# Patient Record
Sex: FEMALE | Race: WHITE | NOT HISPANIC OR LATINO | Employment: FULL TIME | URBAN - METROPOLITAN AREA
[De-identification: names, ages, dates, MRNs, and addresses within clinical notes are randomized per-mention and may not be internally consistent; named-entity substitution may affect disease eponyms.]

---

## 2012-03-22 LAB — EXTERNAL HIV SCREEN: NORMAL

## 2017-03-27 ENCOUNTER — ALLSCRIPTS OFFICE VISIT (OUTPATIENT)
Dept: OTHER | Facility: OTHER | Age: 37
End: 2017-03-27

## 2017-05-10 ENCOUNTER — ALLSCRIPTS OFFICE VISIT (OUTPATIENT)
Dept: OTHER | Facility: OTHER | Age: 37
End: 2017-05-10

## 2017-10-30 ENCOUNTER — ALLSCRIPTS OFFICE VISIT (OUTPATIENT)
Dept: OTHER | Facility: OTHER | Age: 37
End: 2017-10-30

## 2017-10-31 NOTE — PROGRESS NOTES
Assessment  1  Acute bronchitis (466 0) (J20 9)    Plan  Acute bronchitis    · Azithromycin 250 MG Oral Tablet; Take 2 tablets today, then one tablet daily for 4  days   · Mprgofvrx-Tjedshjx-FB 30-2-10 MG/5ML Oral Syrup; TAKE 5 ML EVERY 4 TO 6  HOURS AS NEEDED   · Follow Up if Not Better Evaluation and Treatment  Follow-up  Status: Complete  Done:  [id number] 05:55PM   · Drink plenty of fluids ; Status:Complete;   Done: 81DYW3927 05:55PM    Discussion/Summary    The patient will increase fluids and rest  Patient will use ibuprofen as needed for fever  Patient use lozenges  Chief Complaint  Patient presented today with stuffy nose, post nasal drip and mucus  Patient c/o of coughing hurts and feeling very weak  History of Present Illness  HPI: Patient is here with congestion, rhinorrhea, postnasal drip, fatigue over the past 2-3 days  No vomiting or diarrhea  Patient does have a cough  The patient with some productive sputum  Patient also with fever  Patient using ibuprofen  Patient also using cough drops  No nausea vomiting or diarrhea  Last menstrual cycle roughly 3 weeks ago  Review of Systems    Constitutional: as noted in HPI    ENT: as noted in HPI  Cardiovascular: no complaints of slow or fast heart rate, no chest pain, no palpitations, no leg claudication or lower extremity edema  Respiratory: as noted in HPI  Breasts: no complaints of breast pain, breast lump or nipple discharge  Gastrointestinal: no complaints of abdominal pain, no constipation, no nausea or diarrhea, no vomiting, no bloody stools  Genitourinary: no complaints of dysuria, no incontinence, no pelvic pain, no dysmenorrhea, no vaginal discharge or abnormal vaginal bleeding  Integumentary: as noted in HPI  Neurological: no complaints of headache, no confusion, no numbness or tingling, no dizziness or fainting  Active Problems  1  Depression with anxiety (300 4) (F41 8)   2  Lower back pain (724 2) (M54 5)   3  Metatarsalgia (726 70) (M77 40)   4  Need for prophylactic vaccination and inoculation against influenza (V04 81) (Z23)   5  Somatic dysfunction of lumbar region (739 3) (M99 03)   6  Tinea corporis (110 5) (B35 4)   7  URTI (acute upper respiratory infection) (465 9) (J06 9)    Past Medical History  1  History of Cardiomyopathy (425 4) (I42 9)   2  History of Depression (311) (F32 9)   3  History of bicuspid aortic valve (V13 65) (Z87 74)   4  History of Unequal leg length (acquired) (736 81) (M21 70)  Active Problems And Past Medical History Reviewed: The active problems and past medical history were reviewed and updated today  Family History  Mother    1  No pertinent family history    Social History   · Denied: History of Alcohol Use (History)   · Never A Smoker    Surgical History  1  History of Aortic Coarctation Repair   2  History of Knee Surgery    Current Meds   1  Betamethasone Valerate 0 1 % External Cream; APPLY SPARINGLY TO AFFECTED   AREA(S) TWICE DAILY; Therapy: 78Rrc0685 to (Last Rx:13Apr2017)  Requested for: 14Apr2017 Ordered   2  Ketoconazole 2 % External Cream; APPLY TO AFFECTED AREA TWICE A DAY; Therapy: 16DZZ9705 to (Evaluate:46Nsn4751)  Requested for: 63XMM0275; Last   Rx:06Wvs6029 Ordered   3  Sertraline HCl - 100 MG Oral Tablet; take 1 tablet by mouth daily; Therapy: 34TXI6374 to (Josue Solomon)  Requested for: 47IRO1068; Last   Rx:06Mar2017 Ordered    The medication list was reviewed and updated today  Allergies  1  Augmentin TABS   2  Bactrim TABS    Vitals   Recorded: 79XPK4850 05:36PM   Temperature 101 9 F, Tympanic   Systolic 894, LLE, Sitting   Diastolic 70, LLE, Sitting   Height 5 ft 6 in   Weight 192 lb 6 oz   BMI Calculated 31 05   BSA Calculated 1 97     Physical Exam    Constitutional   General appearance: No acute distress, well appearing and well nourished  Eyes   Conjunctiva and lids: No swelling, erythema or discharge      Pupils and irises: Equal, round and reactive to light  Ears, Nose, Mouth, and Throat   External inspection of ears and nose: Normal     Otoscopic examination: Tympanic membranes translucent with normal light reflex  Canals patent without erythema  Nasal mucosa, septum, and turbinates: Normal without edema or erythema  Oropharynx: Abnormal  -- Postnasal drip  Pulmonary   Respiratory effort: No increased work of breathing or signs of respiratory distress  Auscultation of lungs: Abnormal  -- Mild rhonchi bilaterally  Cardiovascular   Palpation of heart: Normal PMI, no thrills  Auscultation of heart: Normal rate and rhythm, normal S1 and S2, without murmurs  Examination of extremities for edema and/or varicosities: Normal     Abdomen   Abdomen: Non-tender, no masses  Liver and spleen: No hepatomegaly or splenomegaly  Lymphatic   Palpation of lymph nodes in neck: No lymphadenopathy  Musculoskeletal   Gait and station: Normal     Digits and nails: Normal without clubbing or cyanosis  Inspection/palpation of joints, bones, and muscles: Normal     Skin   Skin and subcutaneous tissue: Normal without rashes or lesions  Neurologic   Cranial nerves: Cranial nerves 2-12 intact  Reflexes: 2+ and symmetric  Sensation: No sensory loss      Psychiatric   Orientation to person, place, and time: Normal     Mood and affect: Normal          Signatures   Electronically signed by : Rhina Diaz DO; Oct 30 2017  5:56PM EST                       (Author)

## 2018-01-13 VITALS
TEMPERATURE: 98 F | DIASTOLIC BLOOD PRESSURE: 70 MMHG | BODY MASS INDEX: 30.23 KG/M2 | WEIGHT: 188.13 LBS | SYSTOLIC BLOOD PRESSURE: 122 MMHG | HEIGHT: 66 IN

## 2018-01-14 VITALS
BODY MASS INDEX: 30.25 KG/M2 | WEIGHT: 188.25 LBS | SYSTOLIC BLOOD PRESSURE: 140 MMHG | HEIGHT: 66 IN | DIASTOLIC BLOOD PRESSURE: 60 MMHG

## 2018-01-14 VITALS
TEMPERATURE: 101.9 F | WEIGHT: 192.38 LBS | HEIGHT: 66 IN | SYSTOLIC BLOOD PRESSURE: 130 MMHG | DIASTOLIC BLOOD PRESSURE: 70 MMHG | BODY MASS INDEX: 30.92 KG/M2

## 2018-01-16 NOTE — MISCELLANEOUS
Message  Return to work or school:   Marisela Bojorquez is under my professional care   She was seen in my office on 10/30/2017   She is able to return to work on  11/01/2017            Signatures   Electronically signed by : Aleksandr Rosas, ; Oct 30 2017  5:59PM EST                       (Author)

## 2018-11-23 ENCOUNTER — OFFICE VISIT (OUTPATIENT)
Dept: FAMILY MEDICINE CLINIC | Facility: CLINIC | Age: 38
End: 2018-11-23
Payer: COMMERCIAL

## 2018-11-23 VITALS
SYSTOLIC BLOOD PRESSURE: 126 MMHG | WEIGHT: 180.8 LBS | HEIGHT: 67 IN | BODY MASS INDEX: 28.38 KG/M2 | TEMPERATURE: 97.1 F | DIASTOLIC BLOOD PRESSURE: 64 MMHG

## 2018-11-23 DIAGNOSIS — H69.83 DYSFUNCTION OF BOTH EUSTACHIAN TUBES: Primary | ICD-10-CM

## 2018-11-23 PROBLEM — H69.93 DYSFUNCTION OF BOTH EUSTACHIAN TUBES: Status: ACTIVE | Noted: 2018-11-23

## 2018-11-23 PROCEDURE — 1036F TOBACCO NON-USER: CPT | Performed by: FAMILY MEDICINE

## 2018-11-23 PROCEDURE — 99213 OFFICE O/P EST LOW 20 MIN: CPT | Performed by: FAMILY MEDICINE

## 2018-11-23 PROCEDURE — 3008F BODY MASS INDEX DOCD: CPT | Performed by: FAMILY MEDICINE

## 2018-11-23 RX ORDER — FLUTICASONE PROPIONATE 50 MCG
1 SPRAY, SUSPENSION (ML) NASAL DAILY
Qty: 16 G | Refills: 0 | Status: SHIPPED | OUTPATIENT
Start: 2018-11-23 | End: 2019-05-30

## 2018-11-23 NOTE — PROGRESS NOTES
Assessment/Plan:  The patient will try Flonase  Guidance given  Diagnoses and all orders for this visit:    Dysfunction of both eustachian tubes  -     fluticasone (FLONASE) 50 mcg/act nasal spray; 1 spray into each nostril daily          Subjective:      Patient ID: Kriss Anne is a 43 y o  female  Patient is here with left ear pain over the past week and half  Patient seen [facility] last week and given amoxicillin without significant improvement  Ear feels blocked  Slight decrease in hearing  No otorrhea  Patient did have episode of dizziness last week  No vomiting or vertigo  Patient does notice some tinnitus  The the right ear feels blocked intermittently also  Patient did notice congestion last week  The patient still with some cough  No fever  No other medication use  Earache    Associated symptoms include headaches  Headache    Associated symptoms include ear pain  The following portions of the patient's history were reviewed and updated as appropriate: allergies, current medications, past family history, past medical history, past social history, past surgical history and problem list     Review of Systems   Constitutional: Negative  HENT: Positive for ear pain  Eyes: Negative  Respiratory: Negative  Cardiovascular: Negative  Gastrointestinal: Negative  Endocrine: Negative  Genitourinary: Negative  Musculoskeletal: Negative  Skin: Negative  Allergic/Immunologic: Negative  Neurological: Positive for headaches  Hematological: Negative  Psychiatric/Behavioral: Negative  Objective:      /64 (BP Location: Right arm, Patient Position: Sitting, Cuff Size: Adult)   Temp (!) 97 1 °F (36 2 °C) (Tympanic)   Ht 5' 6 75" (1 695 m)   Wt 82 kg (180 lb 12 8 oz)   LMP 10/26/2018 (Exact Date)   BMI 28 53 kg/m²          Physical Exam   Constitutional: She is oriented to person, place, and time   She appears well-developed and well-nourished  No distress  HENT:   Head: Normocephalic  Right Ear: External ear normal    Left Ear: External ear normal    Mouth/Throat: Oropharyngeal exudate present  Eyes: Pupils are equal, round, and reactive to light  EOM are normal  Right eye exhibits no discharge  Left eye exhibits no discharge  No scleral icterus  Neck: Normal range of motion  Neck supple  No thyromegaly present  Cardiovascular: Normal rate, regular rhythm and intact distal pulses  Exam reveals no gallop and no friction rub  Murmur heard  Pulmonary/Chest: Effort normal and breath sounds normal  No respiratory distress  She has no wheezes  She has no rales  She exhibits no tenderness  Abdominal: Soft  Bowel sounds are normal  She exhibits no distension  There is no tenderness  There is no rebound and no guarding  Musculoskeletal: Normal range of motion  She exhibits no edema or tenderness  Lymphadenopathy:     She has no cervical adenopathy  Neurological: She is oriented to person, place, and time  No cranial nerve deficit  She exhibits normal muscle tone  Coordination normal    Skin: Skin is warm and dry  No rash noted  She is not diaphoretic  No erythema  No pallor  Psychiatric: She has a normal mood and affect  Her behavior is normal  Judgment and thought content normal    Nursing note and vitals reviewed

## 2019-05-30 ENCOUNTER — OFFICE VISIT (OUTPATIENT)
Dept: FAMILY MEDICINE CLINIC | Facility: CLINIC | Age: 39
End: 2019-05-30
Payer: COMMERCIAL

## 2019-05-30 VITALS
DIASTOLIC BLOOD PRESSURE: 68 MMHG | HEIGHT: 66 IN | BODY MASS INDEX: 27.32 KG/M2 | TEMPERATURE: 98.4 F | SYSTOLIC BLOOD PRESSURE: 92 MMHG | WEIGHT: 170 LBS

## 2019-05-30 DIAGNOSIS — M25.531 RIGHT WRIST PAIN: Primary | ICD-10-CM

## 2019-05-30 DIAGNOSIS — M65.9 TENOSYNOVITIS: ICD-10-CM

## 2019-05-30 PROBLEM — M65.90 TENOSYNOVITIS: Status: ACTIVE | Noted: 2019-05-30

## 2019-05-30 PROCEDURE — 99213 OFFICE O/P EST LOW 20 MIN: CPT | Performed by: FAMILY MEDICINE

## 2019-05-30 PROCEDURE — 1036F TOBACCO NON-USER: CPT | Performed by: FAMILY MEDICINE

## 2019-05-30 PROCEDURE — 3008F BODY MASS INDEX DOCD: CPT | Performed by: FAMILY MEDICINE

## 2019-05-30 RX ORDER — MELOXICAM 15 MG/1
15 TABLET ORAL DAILY
Qty: 30 TABLET | Refills: 1 | Status: SHIPPED | OUTPATIENT
Start: 2019-05-30 | End: 2019-07-11 | Stop reason: SDUPTHER

## 2019-07-11 ENCOUNTER — OFFICE VISIT (OUTPATIENT)
Dept: FAMILY MEDICINE CLINIC | Facility: CLINIC | Age: 39
End: 2019-07-11
Payer: COMMERCIAL

## 2019-07-11 VITALS
DIASTOLIC BLOOD PRESSURE: 58 MMHG | TEMPERATURE: 98.1 F | HEIGHT: 65 IN | BODY MASS INDEX: 28.16 KG/M2 | SYSTOLIC BLOOD PRESSURE: 120 MMHG | WEIGHT: 169 LBS

## 2019-07-11 DIAGNOSIS — M25.561 ACUTE PAIN OF RIGHT KNEE: Primary | ICD-10-CM

## 2019-07-11 DIAGNOSIS — M25.561 RIGHT KNEE PAIN, UNSPECIFIED CHRONICITY: Primary | ICD-10-CM

## 2019-07-11 DIAGNOSIS — M25.531 RIGHT WRIST PAIN: ICD-10-CM

## 2019-07-11 DIAGNOSIS — M65.9 TENOSYNOVITIS: ICD-10-CM

## 2019-07-11 PROCEDURE — 99213 OFFICE O/P EST LOW 20 MIN: CPT | Performed by: FAMILY MEDICINE

## 2019-07-11 RX ORDER — MELOXICAM 15 MG/1
15 TABLET ORAL DAILY
Qty: 30 TABLET | Refills: 1 | Status: SHIPPED | OUTPATIENT
Start: 2019-07-11 | End: 2019-08-12 | Stop reason: SDUPTHER

## 2019-07-11 NOTE — PROGRESS NOTES
Assessment/Plan:  Patient will go for x-ray of the right knee  Patient will go to physical therapy  Follow-up in 6 weeks  Patient will continue with meloxicam   Patient can use ice  The patient may need MRI of the right knee to rule out meniscal tear medially     Diagnoses and all orders for this visit:    Acute pain of right knee  -     Ambulatory referral to Physical Therapy; Future    Right wrist pain  -     meloxicam (MOBIC) 15 mg tablet; Take 1 tablet (15 mg total) by mouth daily    Tenosynovitis  -     meloxicam (MOBIC) 15 mg tablet; Take 1 tablet (15 mg total) by mouth daily          Subjective:      Patient ID: Gissel Barnes is a 35 y o  female  Patient has been having some right knee pain over past the 5 days or so  No direct trauma noted  Patient has been doing more work outs  Patient has noticed some swelling  No ecchymosis or redness noted  Patient does notice crepitus  Giving way cessation noted  Patient has noticed some pain with pivoting      The following portions of the patient's history were reviewed and updated as appropriate: allergies, current medications, past family history, past medical history, past social history, past surgical history and problem list     Review of Systems   Constitutional: Negative  HENT: Negative  Eyes: Negative  Respiratory: Negative  Cardiovascular: Negative  Gastrointestinal: Negative  Endocrine: Negative  Genitourinary: Negative  Musculoskeletal: Positive for arthralgias  Skin: Negative  Allergic/Immunologic: Negative  Neurological: Negative  Hematological: Negative  Psychiatric/Behavioral: Negative  Objective:      /58 (BP Location: Right arm, Patient Position: Sitting, Cuff Size: Standard)   Temp 98 1 °F (36 7 °C) (Tympanic)   Ht 5' 5" (1 651 m)   Wt 76 7 kg (169 lb)   LMP 06/23/2019 (Approximate)   Breastfeeding?  No   BMI 28 12 kg/m²          Physical Exam   Constitutional: She appears well-developed and well-nourished  Musculoskeletal: Normal range of motion  She exhibits tenderness  Right knee pain with palpation over the medial joint line  No significant pain over lateral joint line or patella or patellar tendon  Patient does have some pain testing the MCL  No significant pain testing LCL  Negative Lachman test   Positive Apley test      Neurological: She is alert  Skin: Skin is warm  Psychiatric: She has a normal mood and affect  Her behavior is normal    Nursing note and vitals reviewed

## 2019-07-13 ENCOUNTER — APPOINTMENT (OUTPATIENT)
Dept: RADIOLOGY | Facility: MEDICAL CENTER | Age: 39
End: 2019-07-13
Payer: COMMERCIAL

## 2019-07-13 DIAGNOSIS — M25.561 RIGHT KNEE PAIN, UNSPECIFIED CHRONICITY: ICD-10-CM

## 2019-07-13 PROCEDURE — 73562 X-RAY EXAM OF KNEE 3: CPT

## 2019-07-18 ENCOUNTER — EVALUATION (OUTPATIENT)
Dept: PHYSICAL THERAPY | Facility: MEDICAL CENTER | Age: 39
End: 2019-07-18
Payer: COMMERCIAL

## 2019-07-18 DIAGNOSIS — M25.561 ACUTE PAIN OF RIGHT KNEE: Primary | ICD-10-CM

## 2019-07-18 PROCEDURE — 97110 THERAPEUTIC EXERCISES: CPT | Performed by: PHYSICAL THERAPIST

## 2019-07-18 PROCEDURE — 97161 PT EVAL LOW COMPLEX 20 MIN: CPT | Performed by: PHYSICAL THERAPIST

## 2019-07-18 NOTE — PROGRESS NOTES
PT Evaluation     Today's date: 2019  Patient name: Sabrina Cota  : 1980  MRN: [id number]  Referring provider: Adolph Blandon DO  Dx:   Encounter Diagnosis     ICD-10-CM    1  Acute pain of right knee M25 561 Ambulatory referral to Physical Therapy                  Assessment  Assessment details: Ms Candance Brownie is a pleasant 35 y o  Female who presents today for physical therapy evaluation of right knee pain  No further referral appears necessary at this time based upon examination findings  Patient presents with primary movement diagnosis of hip girdle and core weakness leading to adverse stresses on the right knee resulting in acute onset of right knee pain limiting her ability to walk and tolerate exercise  There is (+) effusion around quadriceps tendon and popliteal fossa  Special tests (+) for possible meniscal pathology  Patient is an excellent candidate for outpatient physical therapy services to address the observed impairments to enable patient to return to walking and participation in exercise at [facility]  Prognosis is good given compliance with PT attendance and HEP performance  If symptoms do not improve as anticipated in 4 weeks patient may benefit from referral to ortho  Please contact me with any questions  Thank you for the referral     Impairment list:  1) hip girdle and core weakness- will address with progressive exercise  2) decreased knee ROM- will address with AAROM and functional mobility exercise  3) quadriceps inhibition- will address with NMRE  Impairments: abnormal gait, abnormal muscle firing, abnormal or restricted ROM, activity intolerance, impaired balance, lacks appropriate home exercise program and pain with function  Understanding of Dx/Px/POC: good   Prognosis: good    Goals  1  Patient will be independent in initial HEP  2  Patient will report decreased pain by 50% in 2 weeks  3  Patient will demonstrate right knee ROM WFL to contralateral side in 2 weeks    4  Patient will demonstrate 5/5 right knee strength in 4 weeks  5  Patient will demonstrate increased hip girdle strength by 1 grade in all planes in 6 weeks  6  Patient will be able to return to participate in walking and exercise at time of discharge without limitation due to pain  Plan  Patient would benefit from: skilled physical therapy  Planned modality interventions: cryotherapy  Planned therapy interventions: manual therapy, neuromuscular re-education, functional ROM exercises, home exercise program, therapeutic exercise, strengthening, stretching, patient education and balance  Frequency: 2x/week decreasing to 1x/week  Duration in weeks: 8  Plan of Care beginning date: 7/18/2019  Plan of Care expiration date: 9/13/2019  Treatment plan discussed with: patient        Subjective Evaluation    History of Present Illness  Mechanism of injury: Ms Mark Augustin is a 35 y o  Female who presents today with reports of acute onset right knee pain  Patient reports onset roughly 1 5 weeks ago after driving about 3 hours when she went to get out of the car  Prior to the onset of most recent pain she does report a history of mild intermittent knee pain with walking  Patient reports arthroscopic right knee surgery 15 years ago  Patient denies any trauma, twisting or popping or locking  She does note frequent clicking in the right knee with walking  She states she went out and purchased a knee brace with medial and lateral supports which she states has helped her tolerate walking better  Patient denies any numbness/tingling  Patient reports her pain and stiffness is worse first thing in the morning that improves as she moves around  Patient did receive x-rays but has not yet received her imaging report  Patient reports her concerns for the pain and she is unable to workout and is having difficulty walking  She reports a history of leg length discrepancy (right leg shorter) in which she wears a heel lift in her right shoe  Occupation: teacher   Pain  Current pain ratin  At best pain ratin  At worst pain ratin  Relieving factors: rest  Aggravating factors: standing, walking and stair climbing (squatting, car transfers)  Progression: worsening    Exercise history: beach body on demand (4x/week)    Treatments  No previous or current treatments  Patient Goals  Patient goals for therapy: return to sport/leisure activities  Patient's goals regarding treatment: return to exercise  Objective     Observations     Right Knee   Positive for effusion  Additional Observation Details  Functional squat: poor, pain limited, medial collapse bilaterally, anterior tibial translation    SLS: right  =  Poor, left  = good    Lateral step down test: not tested secondary to pain    Tenderness     Right Knee   Tenderness in the medial joint line, popliteal fossa and quadriceps tendon  No tenderness in the lateral joint line  Active Range of Motion   Left Knee   Normal active range of motion    Right Knee   Flexion: 100 degrees with pain  Extension: 0 degrees     Passive Range of Motion     Right Knee   Flexion: 120 degrees with pain    Additional Passive Range of Motion Details  Mild hamstring muscle length impairments on right  Mild gastroc muscle length impairments on right  Quadriceps muscle length inconclusive secondary to pain limitation    Mobility   Patellar Mobility:     Right Knee   WFL: medial, lateral, superior and inferior    Strength/Myotome Testing     Left Knee   Normal strength  Quadriceps contraction: good    Right Knee   Flexion: 4+  Prone flexion: 4  Extension: 4-  Quadriceps contraction: poor    Additional Strength Details  Gross 3+/5 hip girdle strength    Tests     Right Knee   Positive Apley's compression, Thessaly's test at 5 degrees and Thessaly's test at 20 degrees     Negative Apley's distraction, lateral Freddy, medial Freddy, valgus stress test at 0 degrees, valgus stress test at 30 degrees, varus stress test at 0 degrees and varus stress test at 30 degrees  Ambulation     Observational Gait   Gait: antalgic   Decreased walking speed         Flowsheet Rows      Most Recent Value   PT/OT G-Codes   Current Score  38   Projected Score  65   FOTO information reviewed  Yes             Precautions: Congenital heart condition      Manual  7/18            Milking maneuver to popliteal fossa prn            PROM right knee prn                                                       Exercise Diary  7/18            Quad sets 5" x10            Heel slides x10            SLR: 4 way nv            bike nv            Clam shells             bridges             weightshifting nv            Hamstring stretch nv            Calf stretch nv                                                                                                                                                               Modalities  7/18            Cold pack  prn

## 2019-07-23 ENCOUNTER — OFFICE VISIT (OUTPATIENT)
Dept: PHYSICAL THERAPY | Facility: MEDICAL CENTER | Age: 39
End: 2019-07-23
Payer: COMMERCIAL

## 2019-07-23 DIAGNOSIS — M25.561 ACUTE PAIN OF RIGHT KNEE: Primary | ICD-10-CM

## 2019-07-23 PROCEDURE — 97140 MANUAL THERAPY 1/> REGIONS: CPT | Performed by: PHYSICAL THERAPIST

## 2019-07-23 PROCEDURE — 97110 THERAPEUTIC EXERCISES: CPT | Performed by: PHYSICAL THERAPIST

## 2019-07-23 NOTE — PROGRESS NOTES
Daily Note     Today's date: 2019  Patient name: Humberto Savage  : 1980  MRN: [id number]  Referring provider: Cale Puckett DO  Dx:   Encounter Diagnosis     ICD-10-CM    1  Acute pain of right knee M25 561                   Subjective: Patient states over the weekend her basement flooded and she was doing a lot of bending and squatting to clean it up which flared up her knee  She states the exercises cause her some discomfort but ease up as she does them  She states her pain continues to be superior to the patella and at the back of the knee  Objective: See treatment diary below      Assessment: Patient unable to tolerate warm up on bike secondary to pain  Trialed McConnel taping no change  STM to distal vastus lateralis and milking maneuver to popliteal acacia with significant pain reduction and improved tolerance to weight acceptance during ambulation  Patient reported improve tolerance to therapeutic exercises post manual interventions  Patient instructed in gentle stretches as part of HEP  Monitor response nv  Plan: Continue per plan of care        Precautions: Congenital heart condition      Manual             Milking maneuver to popliteal fossa prn x5'           Distal vastus lateralis   x5'                                                      Exercise Diary             Quad sets 5" x10 5"x10           Heel slides x10 x10           SLR:flexion nv            bike nv np-pain           Clam shells             bridges             weightshifting nv np           Hamstring stretch long sitting nv 30"x3           Calf stretch nv 30"x3           Quad stretch prone  30"x3                                                                                                                                                 Modalities             Cold pack  prn np

## 2019-07-24 ENCOUNTER — OFFICE VISIT (OUTPATIENT)
Dept: PHYSICAL THERAPY | Facility: MEDICAL CENTER | Age: 39
End: 2019-07-24
Payer: COMMERCIAL

## 2019-07-24 DIAGNOSIS — M25.561 ACUTE PAIN OF RIGHT KNEE: Primary | ICD-10-CM

## 2019-07-24 PROCEDURE — 97140 MANUAL THERAPY 1/> REGIONS: CPT | Performed by: PHYSICAL THERAPIST

## 2019-07-24 PROCEDURE — 97110 THERAPEUTIC EXERCISES: CPT | Performed by: PHYSICAL THERAPIST

## 2019-07-24 NOTE — PROGRESS NOTES
Daily Note     Today's date: 2019  Patient name: Ambrocio Barros  : 1980  MRN: [id number]  Referring provider: Eber Barnes DO  Dx:   Encounter Diagnosis     ICD-10-CM    1  Acute pain of right knee M25 561                   Subjective: Patient reports significant improvement in pain and walking since yesterdays treatment session  She reports decreased stiffness and swelling in the back of her knee  Objective: See treatment diary below      Assessment: Patient with improved gait mechanics today with improved heel strike and push off and able to achieve TKE and adequate flexion during swing phase  Patient reported positive response to manual interventions  Reviewed and performed comprehensive HEP as patient will be away for the next 2 weeks  Patient instructed in self STM  Monitor response when patient returns  Plan: Continue per plan of care        Precautions: Congenital heart condition      Manual            Milking maneuver to popliteal fossa prn x5' x5'          Distal vastus lateralis   x5' x5'                                                     Exercise Diary            Quad sets 5" x10 5"x10 5"x20          Heel slides x10 x10 x10          SLR:flexion nv  2x10    4 way nv          bike nv np-pain           Clam shells   nv          bridges   nv          weightshifting nv np           Hamstring stretch long sitting nv 30"x3 30"x3          Calf stretch nv 30"x3 30"x3          Quad stretch prone  30"x3 30"x3                                                                                                                                                Modalities            Cold pack  prn np

## 2019-08-07 ENCOUNTER — OFFICE VISIT (OUTPATIENT)
Dept: PHYSICAL THERAPY | Facility: MEDICAL CENTER | Age: 39
End: 2019-08-07
Payer: COMMERCIAL

## 2019-08-07 DIAGNOSIS — M25.561 ACUTE PAIN OF RIGHT KNEE: Primary | ICD-10-CM

## 2019-08-07 PROCEDURE — 97140 MANUAL THERAPY 1/> REGIONS: CPT | Performed by: PHYSICAL THERAPIST

## 2019-08-07 PROCEDURE — 97110 THERAPEUTIC EXERCISES: CPT | Performed by: PHYSICAL THERAPIST

## 2019-08-07 NOTE — PROGRESS NOTES
Daily Note     Today's date: 2019  Patient name: Joya Pump  : 1980  MRN: [id number]  Referring provider: Jojo Perdue DO  Dx:   Encounter Diagnosis     ICD-10-CM    1  Acute pain of right knee M25 561                   Subjective: Patient states while she was away on vacation she only needed to use her brace intermittently for walking on the sand  She states she had no issue with the 3 flights of stairs and states her knee pain is significantly improved   Objective: See treatment diary below    Knee ROM 0-140 degrees  Gross 4+/5 knee strength  Outcome measures: FOTO = 51 (improved from score of 38 at intake)      Assessment: Patient returns today after being away on vacation for one week  Patient ambulating without any gait deviations  Significant improvements in ROM and strength of the right knee since initial evaluation  Progressed SLR exercises to multidirectional today in which patient did have minor discomfort with abd/add- hold until able to perform without discomfort  Monitor response nv and progress treatment as tolerated  Plan: Continue per plan of care        Precautions: Congenital heart condition      Manual           Milking maneuver to popliteal fossa prn x5' x5' x5         Distal vastus lateralis   x5' x5' x5'                                                    Exercise Diary           Quad sets 5" x10 5"x10 5"x20 5"x20 prone         Heel slides x10 x10 x10 HEP         SLR:flexion nv  2x10    4 way nv 2x10 ea         bike nv np-pain           Wall squats   nv 2x10         bridges   nv 2x10         weightshifting nv np           Hamstring stretch long sitting nv 30"x3 30"x3 30"x3         Calf stretch nv 30"x3 30"x3 30"x3         Quad stretch prone  30"x3 30"x3 30"x3         Step ups with SL balance    nv                                                                                                                                  Modalities 7/18 7/23 7/24 8/7         Cold pack  prn np  x5'

## 2019-08-09 ENCOUNTER — OFFICE VISIT (OUTPATIENT)
Dept: PHYSICAL THERAPY | Facility: MEDICAL CENTER | Age: 39
End: 2019-08-09
Payer: COMMERCIAL

## 2019-08-09 DIAGNOSIS — M25.561 ACUTE PAIN OF RIGHT KNEE: Primary | ICD-10-CM

## 2019-08-09 PROCEDURE — 97110 THERAPEUTIC EXERCISES: CPT | Performed by: PHYSICAL THERAPIST

## 2019-08-09 PROCEDURE — 97140 MANUAL THERAPY 1/> REGIONS: CPT | Performed by: PHYSICAL THERAPIST

## 2019-08-09 NOTE — PROGRESS NOTES
Daily Note     Today's date: 2019  Patient name: Brian Mansfield  : 1980  MRN: [id number]  Referring provider: Tyrone Hernández DO  Dx:   Encounter Diagnosis     ICD-10-CM    1  Acute pain of right knee M25 561                   Subjective: Patient reports increased pain following previous treatment session  Objective: See treatment diary below      Assessment: Treatment session regressed today secondary to poor response following previous treatment session  Positive response to manual interventions with decreased pain during ambulation  Modified hamstring stretch to avoid medial knee pain that she was experiencing  Performed all TE in pain free range  KT applied to medial knee for support during ambulation in which patient states helped  Monitor response nv  Plan: Continue per plan of care        Precautions: Congenital heart condition      Manual          Milking maneuver to popliteal fossa prn x5' x5' x5 x5'        Distal vastus lateralis   x5' x5' x5' x5'                                                   Exercise Diary          Quad sets 5" x10 5"x10 5"x20 5"x20 prone 5"x20        Heel slides x10 x10 x10 HEP x15        SLR:flexion nv  2x10    4 way nv 2x10 ea 2x10 flexion only        bike nv np-pain           Wall squats   nv 2x10 np        bridges   nv 2x10 np        Supine clam     OTB x20        Hamstring stretch long sitting nv 30"x3 30"x3 30"x3 30"x3  supine        Calf stretch nv 30"x3 30"x3 30"x3 30"x3        Quad stretch prone  30"x3 30"x3 30"x3 30"x3        Step ups with SL balance    nv                                                                                                                                  Modalities   8/        Cold pack  prn np  x5'         KT     medial knee

## 2019-08-12 ENCOUNTER — OFFICE VISIT (OUTPATIENT)
Dept: FAMILY MEDICINE CLINIC | Facility: CLINIC | Age: 39
End: 2019-08-12
Payer: COMMERCIAL

## 2019-08-12 VITALS
BODY MASS INDEX: 27.48 KG/M2 | TEMPERATURE: 99.2 F | HEIGHT: 66 IN | WEIGHT: 171 LBS | DIASTOLIC BLOOD PRESSURE: 64 MMHG | SYSTOLIC BLOOD PRESSURE: 158 MMHG

## 2019-08-12 DIAGNOSIS — M25.531 RIGHT WRIST PAIN: ICD-10-CM

## 2019-08-12 DIAGNOSIS — J01.10 ACUTE NON-RECURRENT FRONTAL SINUSITIS: Primary | ICD-10-CM

## 2019-08-12 DIAGNOSIS — M65.9 TENOSYNOVITIS: ICD-10-CM

## 2019-08-12 PROCEDURE — 99213 OFFICE O/P EST LOW 20 MIN: CPT | Performed by: FAMILY MEDICINE

## 2019-08-12 PROCEDURE — 3008F BODY MASS INDEX DOCD: CPT | Performed by: FAMILY MEDICINE

## 2019-08-12 RX ORDER — CEFDINIR 300 MG/1
300 CAPSULE ORAL EVERY 12 HOURS SCHEDULED
Qty: 20 CAPSULE | Refills: 0 | Status: SHIPPED | OUTPATIENT
Start: 2019-08-12 | End: 2019-08-22

## 2019-08-12 RX ORDER — METHYLPREDNISOLONE 4 MG/1
TABLET ORAL
Qty: 21 EACH | Refills: 0 | Status: SHIPPED | OUTPATIENT
Start: 2019-08-12 | End: 2020-02-10 | Stop reason: ALTCHOICE

## 2019-08-12 RX ORDER — MELOXICAM 15 MG/1
15 TABLET ORAL DAILY
Qty: 30 TABLET | Refills: 1 | Status: SHIPPED | OUTPATIENT
Start: 2019-08-12 | End: 2020-02-10 | Stop reason: ALTCHOICE

## 2019-08-12 NOTE — PROGRESS NOTES
Assessment/Plan:  Patient will try prednisone taper for tenosynovitis/swelling over right wrist   To consider further workup with laboratory studies  Patient use Omnicef for sinusitis  Follow-up as needed       Diagnoses and all orders for this visit:    Acute non-recurrent frontal sinusitis  -     cefdinir (OMNICEF) 300 mg capsule; Take 1 capsule (300 mg total) by mouth every 12 (twelve) hours for 10 days    Right wrist pain  -     meloxicam (MOBIC) 15 mg tablet; Take 1 tablet (15 mg total) by mouth daily  -     methylPREDNISolone 4 MG tablet therapy pack; Use as directed on package    Tenosynovitis  -     meloxicam (MOBIC) 15 mg tablet; Take 1 tablet (15 mg total) by mouth daily  -     methylPREDNISolone 4 MG tablet therapy pack; Use as directed on package            Subjective:        Patient ID: Juancho Dunham is a 35 y o  female  Patient with ongoing sinus congestion along with pressure head and cough over the past week and half  Patient also with sore throat intermittently  Patient also with ongoing right wrist pain  Patient is noticing some swelling and redness/warm  The patient just started retaking meloxicam         The following portions of the patient's history were reviewed and updated as appropriate: allergies, current medications, past family history, past medical history, past social history, past surgical history and problem list       Review of Systems   Constitutional: Negative  Negative for fever  HENT: Positive for congestion, postnasal drip, sinus pressure and sinus pain  Eyes: Negative  Respiratory: Positive for cough  Cardiovascular: Negative  Gastrointestinal: Negative  Endocrine: Negative  Genitourinary: Negative  Musculoskeletal: Positive for arthralgias  Skin: Negative  Allergic/Immunologic: Negative  Neurological: Negative  Hematological: Negative  Psychiatric/Behavioral: Negative  Objective:      BMI Counseling:  Body mass index is 27 6 kg/m²  Discussed the patient's BMI with her  The BMI is above average  BMI counseling and education was provided to the patient  Nutrition recommendations include reducing portion sizes  /64 (BP Location: Right arm, Patient Position: Sitting, Cuff Size: Adult)   Temp 99 2 °F (37 3 °C) (Tympanic)   Ht 5' 6" (1 676 m)   Wt 77 6 kg (171 lb)   LMP 07/29/2019 (Approximate)   BMI 27 60 kg/m²          Physical Exam   Constitutional: She is oriented to person, place, and time  She appears well-developed and well-nourished  No distress  HENT:   Head: Normocephalic  Right Ear: External ear normal    Left Ear: External ear normal    Eyes: Pupils are equal, round, and reactive to light  EOM are normal  Right eye exhibits no discharge  Left eye exhibits no discharge  No scleral icterus  Neck: Normal range of motion  Neck supple  No thyromegaly present  Cardiovascular: Normal rate, regular rhythm and intact distal pulses  Exam reveals no gallop and no friction rub  Murmur heard  Pulmonary/Chest: Effort normal and breath sounds normal  No respiratory distress  She has no wheezes  She has no rales  She exhibits no tenderness  Abdominal: Soft  Bowel sounds are normal  She exhibits no distension  There is no tenderness  There is no rebound and no guarding  Musculoskeletal: She exhibits tenderness  She exhibits no edema  Right wrist swelling and warmth along with decreased range of motion secondary to pain  Lymphadenopathy:     She has no cervical adenopathy  Neurological: She is oriented to person, place, and time  No cranial nerve deficit  She exhibits normal muscle tone  Coordination normal    Skin: Skin is warm and dry  No rash noted  She is not diaphoretic  No erythema  No pallor  Psychiatric: She has a normal mood and affect  Her behavior is normal  Judgment and thought content normal    Nursing note and vitals reviewed

## 2019-08-14 ENCOUNTER — OFFICE VISIT (OUTPATIENT)
Dept: PHYSICAL THERAPY | Facility: MEDICAL CENTER | Age: 39
End: 2019-08-14
Payer: COMMERCIAL

## 2019-08-14 DIAGNOSIS — M25.561 ACUTE PAIN OF RIGHT KNEE: Primary | ICD-10-CM

## 2019-08-14 PROCEDURE — 97140 MANUAL THERAPY 1/> REGIONS: CPT | Performed by: PHYSICAL THERAPIST

## 2019-08-14 PROCEDURE — 97110 THERAPEUTIC EXERCISES: CPT | Performed by: PHYSICAL THERAPIST

## 2019-08-14 NOTE — PROGRESS NOTES
Daily Note     Today's date: 2019  Patient name: Gale Mccloud  : 1980  MRN: [id number]  Referring provider: Mortimer Liming, DO  Dx:   Encounter Diagnosis     ICD-10-CM    1  Acute pain of right knee M25 561                   Subjective: Patient states  her knee was flared up again however it feels better today and the past two days  She states her right wrist had also become swollen and painful in which she followed up with her PCP  She was prescribed prednisone in which her swelling and pain have both improved  She states she has a follow up with her PCP next week and may be sent for blood work to rule out possible gout or RA  Objective: See treatment diary below      Assessment: Positive response to manual interventions with improved tolerance to ambulation  Continued with low impact, NWBing exercise to decrease stress on the right knee  Patient performed all exercises pain free and overall walked out of clinic with decreased pain compared to start of treatment session  Monitor response nv  Gradual progression if appropriate  Plan: Continue per plan of care        Precautions: Congenital heart condition      Manual         Milking maneuver to popliteal fossa prn x5' x5' x5 x5' x5'       Distal vastus lateralis   x5' x5' x5' x5' x5'                                                  Exercise Diary         Quad sets 5" x10 5"x10 5"x20 5"x20 prone 5"x20 5"x20       Heel slides x10 x10 x10 HEP x15 x20       SLR:flexion nv  2x10    4 way nv 2x10 ea 2x10 flexion only 3x10 flexion only       bike nv np-pain           Wall squats   nv 2x10 np        bridges   nv 2x10 np        Supine clam     OTB x20 OTB x30       Hamstring stretch long sitting nv 30"x3 30"x3 30"x3 30"x3  supine 30"x3 supine       Calf stretch nv 30"x3 30"x3 30"x3 30"x3 30"x3       Quad stretch prone  30"x3 30"x3 30"x3 30"x3 30"x3       Step ups with SL balance    nv Modalities  7/18 7/23 7/24 8/7 8/9 8/14       Cold pack  prn np  x5'         KT     medial knee

## 2019-08-16 ENCOUNTER — OFFICE VISIT (OUTPATIENT)
Dept: PHYSICAL THERAPY | Facility: MEDICAL CENTER | Age: 39
End: 2019-08-16
Payer: COMMERCIAL

## 2019-08-16 DIAGNOSIS — M25.561 ACUTE PAIN OF RIGHT KNEE: Primary | ICD-10-CM

## 2019-08-16 PROCEDURE — 97140 MANUAL THERAPY 1/> REGIONS: CPT | Performed by: PHYSICAL THERAPIST

## 2019-08-16 PROCEDURE — 97110 THERAPEUTIC EXERCISES: CPT | Performed by: PHYSICAL THERAPIST

## 2019-08-16 NOTE — PROGRESS NOTES
Daily Note     Today's date: 2019  Patient name: Patricia Karimi  : 1980  MRN: [id number]  Referring provider: Rajeev Law DO  Dx:   Encounter Diagnosis     ICD-10-CM    1  Acute pain of right knee M25 561                   Subjective: Patient states her son was stuck in a tree yesterday and she was able to climb up to help him down  She states she did use a ladder to get down  She states overall her knee as been feeling good  Objective: See treatment diary below      Assessment: Decreased soft tissue restrictions in distal vastus lateralis and minimal swelling in popliteal fossa  ROM of the right knee WNL today  Continued with low impact, NWBing exercises today with good tolerance  Monitor response and progress gradually nv  Plan: Continue per plan of care        Precautions: Congenital heart condition      Manual        Milking maneuver to popliteal fossa prn x5' x5' x5 x5' x5' x5'      Distal vastus lateralis   x5' x5' x5' x5' x5' x5'                                                 Exercise Diary        Quad sets 5" x10 5"x10 5"x20 5"x20 prone 5"x20 5"x20 5"x30      Heel slides x10 x10 x10 HEP x15 x20 x20      SLR:flexion nv  2x10    4 way nv 2x10 ea 2x10 flexion only 3x10 flexion only 3x10 flexion only      bike nv np-pain           Wall squats   nv 2x10 np        bridges   nv 2x10 np        Supine clam     OTB x20 OTB x30 Pink x30      Hamstring stretch long sitting nv 30"x3 30"x3 30"x3 30"x3  supine 30"x3 supine 30"x3 supine      Calf stretch nv 30"x3 30"x3 30"x3 30"x3 30"x3 30"x3      Quad stretch prone  30"x3 30"x3 30"x3 30"x3 30"x3 30"x3      Step ups with SL balance    nv                                                                                                                                  Modalities         Cold pack  prn np  x5'         KT     medial knee

## 2019-08-21 ENCOUNTER — APPOINTMENT (OUTPATIENT)
Dept: PHYSICAL THERAPY | Facility: MEDICAL CENTER | Age: 39
End: 2019-08-21
Payer: COMMERCIAL

## 2019-08-23 ENCOUNTER — OFFICE VISIT (OUTPATIENT)
Dept: PHYSICAL THERAPY | Facility: MEDICAL CENTER | Age: 39
End: 2019-08-23
Payer: COMMERCIAL

## 2019-08-23 ENCOUNTER — OFFICE VISIT (OUTPATIENT)
Dept: FAMILY MEDICINE CLINIC | Facility: CLINIC | Age: 39
End: 2019-08-23
Payer: COMMERCIAL

## 2019-08-23 VITALS
SYSTOLIC BLOOD PRESSURE: 132 MMHG | HEIGHT: 66 IN | BODY MASS INDEX: 27.48 KG/M2 | HEART RATE: 66 BPM | OXYGEN SATURATION: 96 % | DIASTOLIC BLOOD PRESSURE: 70 MMHG | WEIGHT: 171 LBS

## 2019-08-23 DIAGNOSIS — M25.561 ACUTE PAIN OF RIGHT KNEE: ICD-10-CM

## 2019-08-23 DIAGNOSIS — M25.561 ACUTE PAIN OF RIGHT KNEE: Primary | ICD-10-CM

## 2019-08-23 DIAGNOSIS — J01.10 ACUTE NON-RECURRENT FRONTAL SINUSITIS: Primary | ICD-10-CM

## 2019-08-23 DIAGNOSIS — Z12.31 SCREENING MAMMOGRAM, ENCOUNTER FOR: ICD-10-CM

## 2019-08-23 PROCEDURE — 97110 THERAPEUTIC EXERCISES: CPT | Performed by: PHYSICAL THERAPIST

## 2019-08-23 PROCEDURE — 97140 MANUAL THERAPY 1/> REGIONS: CPT | Performed by: PHYSICAL THERAPIST

## 2019-08-23 PROCEDURE — 99213 OFFICE O/P EST LOW 20 MIN: CPT | Performed by: FAMILY MEDICINE

## 2019-08-23 PROCEDURE — 1036F TOBACCO NON-USER: CPT | Performed by: FAMILY MEDICINE

## 2019-08-23 RX ORDER — LEVOFLOXACIN 500 MG/1
500 TABLET, FILM COATED ORAL EVERY 24 HOURS
Qty: 10 TABLET | Refills: 0 | Status: SHIPPED | OUTPATIENT
Start: 2019-08-23 | End: 2019-08-30 | Stop reason: SDUPTHER

## 2019-08-23 NOTE — PROGRESS NOTES
Assessment/Plan:  Knee pain improved overall  Continue with physical therapy  Patient may use meloxicam as needed and ice appropriately  Diagnoses and all orders for this visit:    Acute non-recurrent frontal sinusitis  -     levofloxacin (LEVAQUIN) 500 mg tablet; Take 1 tablet (500 mg total) by mouth every 24 hours for 10 days    Screening mammogram, encounter for  -     Mammo screening bilateral w cad; Future    Acute pain of right knee            Subjective:        Patient ID: Patricia Karimi is a 35 y o  female  Patient follow-up on knee pain  Patient is seeing physical therapy the  Patient does notice some soreness  The is improving overall  Patient is seeing them weekly  Patient still having cough along with rhinorrhea the and congestion and sinus discomfort  Patient off meloxicam         The following portions of the patient's history were reviewed and updated as appropriate: allergies, current medications, past family history, past medical history, past social history, past surgical history and problem list       Review of Systems   Constitutional: Negative  Negative for fever  HENT: Positive for congestion, postnasal drip, sinus pressure and sinus pain  Negative for sore throat  Eyes: Negative  Respiratory: Positive for cough  Cardiovascular: Negative  Gastrointestinal: Negative  Endocrine: Negative  Genitourinary: Negative  Musculoskeletal: Positive for arthralgias  Skin: Negative  Allergic/Immunologic: Negative  Neurological: Negative  Hematological: Negative  Psychiatric/Behavioral: Negative  Objective:      BMI Counseling: Body mass index is 27 6 kg/m²  Discussed the patient's BMI with her  The BMI is above average  BMI counseling and education was provided to the patient  Nutrition recommendations include reducing portion sizes        Depression Screening Follow-up Plan: Patient's depression screening was positive with a PHQ-2 score of   Their PHQ-9 score was   Patient assessed for underlying major depression  They have no active suicidal ideations  Brief counseling provided and recommend additional follow-up/re-evaluation next office visit  /70 (BP Location: Right arm, Patient Position: Sitting, Cuff Size: Standard)   Pulse 66   Ht 5' 6" (1 676 m)   Wt 77 6 kg (171 lb)   LMP 07/29/2019 (Approximate)   SpO2 96%   BMI 27 60 kg/m²          Physical Exam   Constitutional: She is oriented to person, place, and time  She appears well-developed and well-nourished  No distress  HENT:   Head: Normocephalic  Right Ear: External ear normal    Left Ear: External ear normal    Mouth/Throat: Oropharyngeal exudate present  Eyes: Pupils are equal, round, and reactive to light  EOM are normal  Right eye exhibits no discharge  Left eye exhibits no discharge  No scleral icterus  Neck: Normal range of motion  Neck supple  No thyromegaly present  Cardiovascular: Normal rate, regular rhythm and intact distal pulses  Exam reveals no gallop and no friction rub  Murmur heard  Pulmonary/Chest: Effort normal and breath sounds normal  No respiratory distress  She has no wheezes  She has no rales  She exhibits no tenderness  Musculoskeletal: Normal range of motion  She exhibits no edema or tenderness  Lymphadenopathy:     She has no cervical adenopathy  Neurological: She is oriented to person, place, and time  No cranial nerve deficit  She exhibits normal muscle tone  Coordination normal    Skin: Skin is warm and dry  No rash noted  She is not diaphoretic  No erythema  No pallor  Psychiatric: She has a normal mood and affect  Her behavior is normal  Judgment and thought content normal    Nursing note and vitals reviewed

## 2019-08-23 NOTE — PROGRESS NOTES
Daily Note     Today's date: 2019  Patient name: Brian Mansfield  : 1980  MRN: [id number]  Referring provider: Tyrone Hernández DO  Dx:   Encounter Diagnosis     ICD-10-CM    1  Acute pain of right knee M25 561                   Subjective: Dannielle Tanner reports her symptoms were minimal to none for almost a full week  She states she [place] in which the ground was uneven in areas where she began to experience mild medial knee pain  Objective: See treatment diary below      Assessment: Patient continues to demonstrate good ROM  (+) to medial joint line of right knee  Patient follows up with PCP later today and will be determining if she will be sent for blood work  Conservative progression secondary to increased joint line tenderness  SLS to work on proprioceptive feedback of the right knee  Patient performed all TE pain free, but did have muscle soreness post treatment  Patient educated in benefit of continuing HEP  Plan: Continue per plan of care        Precautions: Congenital heart condition      Manual       Milking maneuver to popliteal fossa prn x5' x5' x5 x5' x5' x5' x5'     Distal vastus lateralis   x5' x5' x5' x5' x5' x5' x5'                                                Exercise Diary       Quad sets 5" x10 5"x10 5"x20 5"x20 prone 5"x20 5"x20 5"x30 5"x30     Heel slides x10 x10 x10 HEP x15 x20 x20 x20     SLR:flexion nv  2x10    4 way nv 2x10 ea 2x10 flexion only 3x10 flexion only 3x10 flexion only 3x10 flex only     bike nv np-pain           Wall squats   nv 2x10 np        bridges   nv 2x10 np        Supine clam     OTB x20 OTB x30 Pink x30 GTB x30     Hamstring stretch long sitting nv 30"x3 30"x3 30"x3 30"x3  supine 30"x3 supine 30"x3 supine 30'x3 supine     Calf stretch nv 30"x3 30"x3 30"x3 30"x3 30"x3 30"x3 30"x3     Quad stretch prone  30"x3 30"x3 30"x3 30"x3 30"x3 30"x3 30"x3 SLS    nv    10" x10                                                                                                                              Modalities  7/18 7/23 7/24 8/7 8/9 8/14       Cold pack  prn np  x5'         KT     medial knee

## 2019-08-29 ENCOUNTER — OFFICE VISIT (OUTPATIENT)
Dept: PHYSICAL THERAPY | Facility: MEDICAL CENTER | Age: 39
End: 2019-08-29
Payer: COMMERCIAL

## 2019-08-29 DIAGNOSIS — M25.561 ACUTE PAIN OF RIGHT KNEE: Primary | ICD-10-CM

## 2019-08-29 PROCEDURE — 97140 MANUAL THERAPY 1/> REGIONS: CPT | Performed by: PHYSICAL THERAPIST

## 2019-08-29 PROCEDURE — 97110 THERAPEUTIC EXERCISES: CPT | Performed by: PHYSICAL THERAPIST

## 2019-08-29 NOTE — PROGRESS NOTES
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  She states she is very pleased with her progress thus far  She did follow up with her physician who is holding on blood work at this time and to continue with PT      Objective: See treatment diary below    FOTO = 64 (improved from 51 at lats assesssment)    Assessment: Conservative progression today secondary to flare ups with progressions at previous treatment session  Overall decreased tissue tension through distal quad  Patient with full right knee ROM, strength improving each week  Progress nv as tolerated if symptoms remain stable  Plan: Continue per plan of care        Precautions: Congenital heart condition      Manual      Milking maneuver to popliteal fossa prn x5' x5' x5 x5' x5' x5' x5' x5'    Distal vastus lateralis   x5' x5' x5' x5' x5' x5' x5' x5'                                               Exercise Diary      Quad sets 5" x10 5"x10 5"x20 5"x20 prone 5"x20 5"x20 5"x30 5"x30 HEP    Heel slides x10 x10 x10 HEP x15 x20 x20 x20 x20    SLR:flexion nv  2x10    4 way nv 2x10 ea 2x10 flexion only 3x10 flexion only 3x10 flexion only 3x10 flex only 1# 3x10 flex only    bike nv np-pain           Wall squats   nv 2x10 np        bridges   nv 2x10 np    nv    Supine clam     OTB x20 OTB x30 Pink x30 GTB x30 GTB x30    Hamstring stretch long sitting nv 30"x3 30"x3 30"x3 30"x3  supine 30"x3 supine 30"x3 supine 30'x3 supine 30"x3 supine    Calf stretch nv 30"x3 30"x3 30"x3 30"x3 30"x3 30"x3 30"x3 30"x3    Quad stretch prone  30"x3 30"x3 30"x3 30"x3 30"x3 30"x3 30"x3 30"x3    SLS    nv    10" x10 10" x10     Step up with SL balance         nv                                                                                                                Modalities  7/18 7/23 7/24 8/7 8/9 8/14       Cold pack  prn np  x5'         KT     medial knee

## 2019-08-30 DIAGNOSIS — J01.10 ACUTE NON-RECURRENT FRONTAL SINUSITIS: ICD-10-CM

## 2019-08-30 RX ORDER — LEVOFLOXACIN 500 MG/1
500 TABLET, FILM COATED ORAL EVERY 24 HOURS
Qty: 5 TABLET | Refills: 0 | Status: SHIPPED | OUTPATIENT
Start: 2019-08-30 | End: 2019-09-09

## 2019-08-30 NOTE — TELEPHONE ENCOUNTER
Patient called, and shes away on vacation in [place], she forgot her LEBAQUIN 500 mg - she needs 3 days worth  I updated pharmacy for this task

## 2019-09-03 ENCOUNTER — OFFICE VISIT (OUTPATIENT)
Dept: PHYSICAL THERAPY | Facility: MEDICAL CENTER | Age: 39
End: 2019-09-03
Payer: COMMERCIAL

## 2019-09-03 DIAGNOSIS — M25.561 ACUTE PAIN OF RIGHT KNEE: Primary | ICD-10-CM

## 2019-09-03 PROCEDURE — 97140 MANUAL THERAPY 1/> REGIONS: CPT | Performed by: PHYSICAL THERAPIST

## 2019-09-03 PROCEDURE — 97110 THERAPEUTIC EXERCISES: CPT | Performed by: PHYSICAL THERAPIST

## 2019-09-03 NOTE — PROGRESS NOTES
Daily Note     Today's date: 9/3/2019  Patient name: Tiny Fajardo  : 1980  MRN: [id number]  Referring provider: Alea Nina DO  Dx:   Encounter Diagnosis     ICD-10-CM    1  Acute pain of right knee M25 561                   Subjective: Ladlynne Liao reports her right knee has been feeling good  She was at the beach over the weekend which she states she did not have any significant limitations in walking  She does note occasional pain if she pivots on her right foot that is planted  Objective: See treatment diary below      Assessment: Continued to gradually progress TE today with good tolerance  Positive response to manual interventions with decreased tightness at knee cap noted following  Patient performed all exercises without pain  Follow up in 1 week and monitor progress  Plan: Continue per plan of care        Precautions: Congenital heart condition      Manual  7/18 7/23 7/24 8/7 8/9 8/14 8/16 8/23 8/29 9/3   Milking maneuver to popliteal fossa prn x5' x5' x5 x5' x5' x5' x5' x5' x5'   Distal vastus lateralis   x5' x5' x5' x5' x5' x5' x5' x5' x5'                                              Exercise Diary   8/9 8/14 8/16 8/23 8/29 9/3   Quad sets 5" x10 5"x10 5"x20 5"x20 prone 5"x20 5"x20 5"x30 5"x30 HEP 5"x30   Heel slides x10 x10 x10 HEP x15 x20 x20 x20 x20    SLR:flexion nv  2x10    4 way nv 2x10 ea 2x10 flexion only 3x10 flexion only 3x10 flexion only 3x10 flex only 1# 3x10 flex only 2# 3x10 flex only 4way nv   bike nv np-pain           Wall squats   nv 2x10 np        bridges   nv 2x10 np    nv 3x10   Supine clam     OTB x20 OTB x30 Pink x30 GTB x30 GTB x30 BTB x30   Hamstring stretch long sitting nv 30"x3 30"x3 30"x3 30"x3  supine 30"x3 supine 30"x3 supine 30'x3 supine 30"x3 supine 30"x3 supine   Calf stretch nv 30"x3 30"x3 30"x3 30"x3 30"x3 30"x3 30"x3 30"x3    Quad stretch prone  30"x3 30"x3 30"x3 30"x3 30"x3 30"x3 30"x3 30"x3 30"x3   SLS    nv    10" x10 10" x10 Step up with SL balance         nv L3 2x10                                                                                                               Modalities  7/18 7/23 7/24 8/7 8/9 8/14       Cold pack  prn np  x5'         KT     medial knee

## 2019-09-05 ENCOUNTER — APPOINTMENT (OUTPATIENT)
Dept: PHYSICAL THERAPY | Facility: MEDICAL CENTER | Age: 39
End: 2019-09-05
Payer: COMMERCIAL

## 2019-09-12 ENCOUNTER — OFFICE VISIT (OUTPATIENT)
Dept: PHYSICAL THERAPY | Facility: MEDICAL CENTER | Age: 39
End: 2019-09-12
Payer: COMMERCIAL

## 2019-09-12 DIAGNOSIS — M25.561 ACUTE PAIN OF RIGHT KNEE: Primary | ICD-10-CM

## 2019-09-12 PROCEDURE — 97140 MANUAL THERAPY 1/> REGIONS: CPT | Performed by: PHYSICAL THERAPIST

## 2019-09-12 PROCEDURE — 97110 THERAPEUTIC EXERCISES: CPT | Performed by: PHYSICAL THERAPIST

## 2019-09-12 NOTE — PROGRESS NOTES
Daily Note     Today's date: 2019  Patient name: Patricia Karimi  : 1980  MRN: [id number]  Referring provider: Rajeev Law DO  Dx:   Encounter Diagnosis     ICD-10-CM    1  Acute pain of right knee M25 561                   Subjective: Andra Talamantes continues to states her right knee is feeling good  She reports intermittent, mild soreness that she states resolves with rest  She states she is starting to resume working out and so far has had no issues with participating in yoga  Objective: See treatment diary below      Assessment: Andra Talamantes continues to progress appropriately at this time  Progressed therapeutic exercises today with no increase in pain  Discussed appropriate progression to resume participation in workouts at home  Monitor response nv  Plan: Continue per plan of care  Reassessment nv        Precautions: Congenital heart condition      Manual              Milking maneuver to popliteal fossa x5'            Distal vastus lateralis  x5'                                                       Exercise Diary              Quad sets 5"x30            Heel slides             SLR:flexion 2# 3x10 flex    2x10 abd, add, ext            bridges 3x10            Supine clam BTB x30            Hamstring stretch supine 30"x3            Quad stretch prone 30"x3            Step up with SL balance L3 2x10                                                                                                                        Modalities              Cold pack              KT

## 2019-09-24 ENCOUNTER — OFFICE VISIT (OUTPATIENT)
Dept: PHYSICAL THERAPY | Facility: MEDICAL CENTER | Age: 39
End: 2019-09-24
Payer: COMMERCIAL

## 2019-09-24 DIAGNOSIS — M25.561 ACUTE PAIN OF RIGHT KNEE: Primary | ICD-10-CM

## 2019-09-24 PROCEDURE — 97110 THERAPEUTIC EXERCISES: CPT | Performed by: PHYSICAL THERAPIST

## 2019-09-24 PROCEDURE — 97140 MANUAL THERAPY 1/> REGIONS: CPT | Performed by: PHYSICAL THERAPIST

## 2019-09-24 NOTE — PROGRESS NOTES
Daily Note     Today's date: 2019  Patient name: Patricia Karimi  : 1980  MRN: [id number]  Referring provider: Rajeev Law DO  Dx:   Encounter Diagnosis     ICD-10-CM    1  Acute pain of right knee M25 561                   Subjective: Andra Talamantes offers no complaints at this time  She states she is gradually resuming an exercise program with no pain noted at this time, she does report mild soreness  Objective: See treatment diary below    Outcome measures: FOTO = 71 (improved from 64 at last assessment)      Assessment: Andra Talamantes continues to make steady progress towards her goals  Progressed therapeutic exercises today with good tolerance  She demonstrates good technique with therapeutic exercises  She did fatigue post treatment session with reported muscle fatigue, no knee pain  We did discuss modifications during exercise program as necessary  Will follow up in 2 weeks to monitor progress and reassess at that time  Potential DC to HEP if patient continues to progress appropriately  Plan: Follow up in 2 weeks  Reassess at that time        Precautions: Congenital heart condition      Manual             Milking maneuver to popliteal fossa x5' x5'           Distal quad/proximal gastroc (medial) x5' x5'                                                      Exercise Diary             Quad sets 5"x30 HEP           Heel slides             SLR:flexion 2# 3x10 flex    2x10 abd, add, ext 2# 3x10   flex   3x10 abd, add, ext           bridges 3x10 3x10           Supine clam BTB x30 BTB x30           Hamstring stretch supine 30"x3 30"x3           Quad stretch prone 30"x3 30"x3           Step up with SL balance L3 2x10 L3 2x10           SLS  ABC's x1                                                                                                          Modalities              Cold pack              KT

## 2020-02-10 ENCOUNTER — OFFICE VISIT (OUTPATIENT)
Dept: FAMILY MEDICINE CLINIC | Facility: CLINIC | Age: 40
End: 2020-02-10
Payer: COMMERCIAL

## 2020-02-10 VITALS
WEIGHT: 172 LBS | DIASTOLIC BLOOD PRESSURE: 70 MMHG | HEIGHT: 66 IN | HEART RATE: 64 BPM | BODY MASS INDEX: 27.64 KG/M2 | OXYGEN SATURATION: 99 % | SYSTOLIC BLOOD PRESSURE: 138 MMHG

## 2020-02-10 DIAGNOSIS — R53.82 CHRONIC FATIGUE: ICD-10-CM

## 2020-02-10 DIAGNOSIS — Q25.1 COARCTATION OF AORTA: Primary | ICD-10-CM

## 2020-02-10 DIAGNOSIS — R07.89 CHEST TIGHTNESS: ICD-10-CM

## 2020-02-10 PROCEDURE — 3008F BODY MASS INDEX DOCD: CPT | Performed by: FAMILY MEDICINE

## 2020-02-10 PROCEDURE — 99214 OFFICE O/P EST MOD 30 MIN: CPT | Performed by: FAMILY MEDICINE

## 2020-02-10 PROCEDURE — 1036F TOBACCO NON-USER: CPT | Performed by: FAMILY MEDICINE

## 2020-02-10 NOTE — PROGRESS NOTES
Assessment/Plan:  The patient go for echo and laboratory studies  Patient will try to rest   Patient follow-up after studies done  Follow-up 2 weeks     Diagnoses and all orders for this visit:    Coarctation of aorta  -     CBC and differential; Future  -     Comprehensive metabolic panel; Future  -     Lipid panel; Future  -     TSH, 3rd generation with Free T4 reflex; Future  -     Hemoglobin A1C; Future  -     UA w Reflex to Microscopic w Reflex to Culture -Lab Collect  -     Vitamin B12; Future  -     C-reactive protein; Future  -     Echo complete with contrast if indicated; Future    Chest tightness  -     CBC and differential; Future  -     Comprehensive metabolic panel; Future  -     Lipid panel; Future  -     TSH, 3rd generation with Free T4 reflex; Future  -     Hemoglobin A1C; Future  -     UA w Reflex to Microscopic w Reflex to Culture -Lab Collect  -     Vitamin B12; Future  -     C-reactive protein; Future  -     Echo complete with contrast if indicated; Future    Chronic fatigue  -     CBC and differential; Future  -     Comprehensive metabolic panel; Future  -     Lipid panel; Future  -     TSH, 3rd generation with Free T4 reflex; Future  -     Hemoglobin A1C; Future  -     UA w Reflex to Microscopic w Reflex to Culture -Lab Collect  -     Vitamin B12; Future  -     C-reactive protein; Future  -     Echo complete with contrast if indicated; Future            Subjective:        Patient ID: Danika Machado is a 35 y o  female  Patient is here feeling tired over the past week or so  Patient was able to work out  Patient did notice some chest pain and shoulder discomfort post working out  Normal urination defecation  Appetite is okay  No significant URI symptoms  No significant anxiety or depression  No visual disturbance  No abdominal pain  No edema  Patient is doing  [address] body workout          The following portions of the patient's history were reviewed and updated as appropriate: allergies, current medications, past family history, past medical history, past social history, past surgical history and problem list       Review of Systems   Constitutional: Negative  HENT: Negative  Eyes: Negative  Respiratory: Positive for chest tightness  Cardiovascular: Negative  Gastrointestinal: Negative  Endocrine: Negative  Genitourinary: Negative  Musculoskeletal: Positive for arthralgias  Skin: Negative  Allergic/Immunologic: Negative  Neurological: Negative  Hematological: Negative  Psychiatric/Behavioral: Negative  Objective:      BMI Counseling: Body mass index is 27 76 kg/m²  The BMI is above normal  Nutrition recommendations include decreasing portion sizes  Exercise recommendations include exercising 3-5 times per week  /70 (BP Location: Right arm)   Pulse 64   Ht 5' 6" (1 676 m)   Wt 78 kg (172 lb)   SpO2 99%   BMI 27 76 kg/m²          Physical Exam   Constitutional: She appears well-developed and well-nourished  No distress  HENT:   Head: Normocephalic  Right Ear: External ear normal    Left Ear: External ear normal    Mouth/Throat: Oropharynx is clear and moist  No oropharyngeal exudate  Eyes: Pupils are equal, round, and reactive to light  EOM are normal  Right eye exhibits no discharge  Left eye exhibits no discharge  No scleral icterus  Neck: Normal range of motion  Neck supple  No thyromegaly present  Cardiovascular: Normal rate, regular rhythm and intact distal pulses  Exam reveals no gallop and no friction rub  Murmur heard  Pulmonary/Chest: Effort normal and breath sounds normal  No respiratory distress  She has no wheezes  She has no rales  She exhibits no tenderness  Musculoskeletal: Normal range of motion  She exhibits no edema or tenderness  Lymphadenopathy:     She has no cervical adenopathy  Neurological: She is alert  No cranial nerve deficit  She exhibits normal muscle tone  Coordination normal    Skin: Skin is warm and dry  No rash noted  She is not diaphoretic  No erythema  No pallor  Psychiatric: She has a normal mood and affect  Her behavior is normal  Judgment and thought content normal    Nursing note and vitals reviewed

## 2020-02-11 ENCOUNTER — APPOINTMENT (OUTPATIENT)
Dept: LAB | Facility: MEDICAL CENTER | Age: 40
End: 2020-02-11
Payer: COMMERCIAL

## 2020-02-11 DIAGNOSIS — R07.89 CHEST TIGHTNESS: ICD-10-CM

## 2020-02-11 DIAGNOSIS — Q25.1 COARCTATION OF AORTA: ICD-10-CM

## 2020-02-11 DIAGNOSIS — R53.82 CHRONIC FATIGUE: ICD-10-CM

## 2020-02-11 LAB
ALBUMIN SERPL BCP-MCNC: 3.9 G/DL (ref 3.5–5)
ALP SERPL-CCNC: 84 U/L (ref 46–116)
ALT SERPL W P-5'-P-CCNC: 32 U/L (ref 12–78)
ANION GAP SERPL CALCULATED.3IONS-SCNC: 2 MMOL/L (ref 4–13)
AST SERPL W P-5'-P-CCNC: 19 U/L (ref 5–45)
BACTERIA UR QL AUTO: ABNORMAL /HPF
BASOPHILS # BLD AUTO: 0.03 THOUSANDS/ΜL (ref 0–0.1)
BASOPHILS NFR BLD AUTO: 1 % (ref 0–1)
BILIRUB SERPL-MCNC: 0.53 MG/DL (ref 0.2–1)
BILIRUB UR QL STRIP: NEGATIVE
BUN SERPL-MCNC: 20 MG/DL (ref 5–25)
CALCIUM SERPL-MCNC: 9.4 MG/DL (ref 8.3–10.1)
CHLORIDE SERPL-SCNC: 108 MMOL/L (ref 100–108)
CHOLEST SERPL-MCNC: 149 MG/DL (ref 50–200)
CLARITY UR: ABNORMAL
CO2 SERPL-SCNC: 28 MMOL/L (ref 21–32)
COLOR UR: YELLOW
CREAT SERPL-MCNC: 0.72 MG/DL (ref 0.6–1.3)
CRP SERPL QL: <3 MG/L
EOSINOPHIL # BLD AUTO: 0.15 THOUSAND/ΜL (ref 0–0.61)
EOSINOPHIL NFR BLD AUTO: 3 % (ref 0–6)
ERYTHROCYTE [DISTWIDTH] IN BLOOD BY AUTOMATED COUNT: 12.8 % (ref 11.6–15.1)
EST. AVERAGE GLUCOSE BLD GHB EST-MCNC: 103 MG/DL
FINE GRAN CASTS URNS QL MICRO: ABNORMAL /LPF
GFR SERPL CREATININE-BSD FRML MDRD: 105 ML/MIN/1.73SQ M
GLUCOSE P FAST SERPL-MCNC: 94 MG/DL (ref 65–99)
GLUCOSE UR STRIP-MCNC: NEGATIVE MG/DL
HBA1C MFR BLD: 5.2 %
HCT VFR BLD AUTO: 42.7 % (ref 34.8–46.1)
HDLC SERPL-MCNC: 60 MG/DL
HGB BLD-MCNC: 13.8 G/DL (ref 11.5–15.4)
HGB UR QL STRIP.AUTO: ABNORMAL
IMM GRANULOCYTES # BLD AUTO: 0.01 THOUSAND/UL (ref 0–0.2)
IMM GRANULOCYTES NFR BLD AUTO: 0 % (ref 0–2)
KETONES UR STRIP-MCNC: NEGATIVE MG/DL
LDLC SERPL CALC-MCNC: 75 MG/DL (ref 0–100)
LEUKOCYTE ESTERASE UR QL STRIP: ABNORMAL
LYMPHOCYTES # BLD AUTO: 1.78 THOUSANDS/ΜL (ref 0.6–4.47)
LYMPHOCYTES NFR BLD AUTO: 31 % (ref 14–44)
MCH RBC QN AUTO: 31.4 PG (ref 26.8–34.3)
MCHC RBC AUTO-ENTMCNC: 32.3 G/DL (ref 31.4–37.4)
MCV RBC AUTO: 97 FL (ref 82–98)
MONOCYTES # BLD AUTO: 0.58 THOUSAND/ΜL (ref 0.17–1.22)
MONOCYTES NFR BLD AUTO: 10 % (ref 4–12)
NEUTROPHILS # BLD AUTO: 3.13 THOUSANDS/ΜL (ref 1.85–7.62)
NEUTS SEG NFR BLD AUTO: 55 % (ref 43–75)
NITRITE UR QL STRIP: NEGATIVE
NON-SQ EPI CELLS URNS QL MICRO: ABNORMAL /HPF
NONHDLC SERPL-MCNC: 89 MG/DL
NRBC BLD AUTO-RTO: 0 /100 WBCS
PH UR STRIP.AUTO: 5.5 [PH]
PLATELET # BLD AUTO: 255 THOUSANDS/UL (ref 149–390)
PMV BLD AUTO: 10.5 FL (ref 8.9–12.7)
POTASSIUM SERPL-SCNC: 3.9 MMOL/L (ref 3.5–5.3)
PROT SERPL-MCNC: 7.6 G/DL (ref 6.4–8.2)
PROT UR STRIP-MCNC: NEGATIVE MG/DL
RBC # BLD AUTO: 4.4 MILLION/UL (ref 3.81–5.12)
RBC #/AREA URNS AUTO: ABNORMAL /HPF
SODIUM SERPL-SCNC: 138 MMOL/L (ref 136–145)
SP GR UR STRIP.AUTO: 1.03 (ref 1–1.03)
TRIGL SERPL-MCNC: 69 MG/DL
TSH SERPL DL<=0.05 MIU/L-ACNC: 1.77 UIU/ML (ref 0.36–3.74)
UROBILINOGEN UR QL STRIP.AUTO: 0.2 E.U./DL
VIT B12 SERPL-MCNC: 429 PG/ML (ref 100–900)
WBC # BLD AUTO: 5.68 THOUSAND/UL (ref 4.31–10.16)
WBC #/AREA URNS AUTO: ABNORMAL /HPF

## 2020-02-11 PROCEDURE — 86140 C-REACTIVE PROTEIN: CPT

## 2020-02-11 PROCEDURE — 80061 LIPID PANEL: CPT

## 2020-02-11 PROCEDURE — 80053 COMPREHEN METABOLIC PANEL: CPT

## 2020-02-11 PROCEDURE — 84443 ASSAY THYROID STIM HORMONE: CPT

## 2020-02-11 PROCEDURE — 81001 URINALYSIS AUTO W/SCOPE: CPT | Performed by: FAMILY MEDICINE

## 2020-02-11 PROCEDURE — 36415 COLL VENOUS BLD VENIPUNCTURE: CPT

## 2020-02-11 PROCEDURE — 87086 URINE CULTURE/COLONY COUNT: CPT | Performed by: FAMILY MEDICINE

## 2020-02-11 PROCEDURE — 83036 HEMOGLOBIN GLYCOSYLATED A1C: CPT

## 2020-02-11 PROCEDURE — 82607 VITAMIN B-12: CPT

## 2020-02-11 PROCEDURE — 85025 COMPLETE CBC W/AUTO DIFF WBC: CPT

## 2020-02-13 ENCOUNTER — TRANSITIONAL CARE MANAGEMENT (OUTPATIENT)
Dept: FAMILY MEDICINE CLINIC | Facility: CLINIC | Age: 40
End: 2020-02-13

## 2020-02-13 DIAGNOSIS — Q25.1 COARCTATION OF AORTA: Primary | ICD-10-CM

## 2020-02-13 LAB — BACTERIA UR CULT: NORMAL

## 2020-02-16 ENCOUNTER — APPOINTMENT (EMERGENCY)
Dept: RADIOLOGY | Facility: HOSPITAL | Age: 40
End: 2020-02-16
Payer: COMMERCIAL

## 2020-02-16 ENCOUNTER — HOSPITAL ENCOUNTER (EMERGENCY)
Facility: HOSPITAL | Age: 40
Discharge: HOME | End: 2020-02-16
Attending: EMERGENCY MEDICINE | Admitting: EMERGENCY MEDICINE
Payer: COMMERCIAL

## 2020-02-16 VITALS
DIASTOLIC BLOOD PRESSURE: 65 MMHG | SYSTOLIC BLOOD PRESSURE: 144 MMHG | OXYGEN SATURATION: 100 % | WEIGHT: 170 LBS | BODY MASS INDEX: 27.44 KG/M2 | HEART RATE: 63 BPM | TEMPERATURE: 97.7 F | RESPIRATION RATE: 16 BRPM

## 2020-02-16 DIAGNOSIS — R06.02 SHORTNESS OF BREATH: Primary | ICD-10-CM

## 2020-02-16 DIAGNOSIS — R07.9 CHEST PAIN: ICD-10-CM

## 2020-02-16 LAB
ANION GAP SERPL CALCULATED.3IONS-SCNC: 2 MMOL/L (ref 4–13)
ATRIAL RATE: 65 BPM
BASOPHILS # BLD AUTO: 0.03 THOUSANDS/ΜL (ref 0–0.1)
BASOPHILS NFR BLD AUTO: 0 % (ref 0–1)
BUN SERPL-MCNC: 21 MG/DL (ref 5–25)
CALCIUM SERPL-MCNC: 8.8 MG/DL (ref 8.3–10.1)
CHLORIDE SERPL-SCNC: 109 MMOL/L (ref 100–108)
CO2 SERPL-SCNC: 28 MMOL/L (ref 21–32)
CREAT SERPL-MCNC: 0.66 MG/DL (ref 0.6–1.3)
EOSINOPHIL # BLD AUTO: 0.2 THOUSAND/ΜL (ref 0–0.61)
EOSINOPHIL NFR BLD AUTO: 3 % (ref 0–6)
ERYTHROCYTE [DISTWIDTH] IN BLOOD BY AUTOMATED COUNT: 12.5 % (ref 11.6–15.1)
GFR SERPL CREATININE-BSD FRML MDRD: 111 ML/MIN/1.73SQ M
GLUCOSE SERPL-MCNC: 90 MG/DL (ref 65–140)
HCT VFR BLD AUTO: 38.4 % (ref 34.8–46.1)
HGB BLD-MCNC: 12.9 G/DL (ref 11.5–15.4)
IMM GRANULOCYTES # BLD AUTO: 0.01 THOUSAND/UL (ref 0–0.2)
IMM GRANULOCYTES NFR BLD AUTO: 0 % (ref 0–2)
LYMPHOCYTES # BLD AUTO: 2.1 THOUSANDS/ΜL (ref 0.6–4.47)
LYMPHOCYTES NFR BLD AUTO: 29 % (ref 14–44)
MCH RBC QN AUTO: 32.2 PG (ref 26.8–34.3)
MCHC RBC AUTO-ENTMCNC: 33.6 G/DL (ref 31.4–37.4)
MCV RBC AUTO: 96 FL (ref 82–98)
MONOCYTES # BLD AUTO: 0.74 THOUSAND/ΜL (ref 0.17–1.22)
MONOCYTES NFR BLD AUTO: 10 % (ref 4–12)
NEUTROPHILS # BLD AUTO: 4.18 THOUSANDS/ΜL (ref 1.85–7.62)
NEUTS SEG NFR BLD AUTO: 58 % (ref 43–75)
NRBC BLD AUTO-RTO: 0 /100 WBCS
P AXIS: 49 DEGREES
PLATELET # BLD AUTO: 232 THOUSANDS/UL (ref 149–390)
PMV BLD AUTO: 9.9 FL (ref 8.9–12.7)
POTASSIUM SERPL-SCNC: 3.9 MMOL/L (ref 3.5–5.3)
PR INTERVAL: 144 MS
QRS AXIS: 80 DEGREES
QRSD INTERVAL: 98 MS
QT INTERVAL: 410 MS
QTC INTERVAL: 426 MS
RBC # BLD AUTO: 4.01 MILLION/UL (ref 3.81–5.12)
SODIUM SERPL-SCNC: 139 MMOL/L (ref 136–145)
T WAVE AXIS: 50 DEGREES
TROPONIN I SERPL-MCNC: <0.02 NG/ML
VENTRICULAR RATE: 65 BPM
WBC # BLD AUTO: 7.26 THOUSAND/UL (ref 4.31–10.16)

## 2020-02-16 PROCEDURE — 85025 COMPLETE CBC W/AUTO DIFF WBC: CPT | Performed by: EMERGENCY MEDICINE

## 2020-02-16 PROCEDURE — 93005 ELECTROCARDIOGRAM TRACING: CPT

## 2020-02-16 PROCEDURE — 99285 EMERGENCY DEPT VISIT HI MDM: CPT

## 2020-02-16 PROCEDURE — 99285 EMERGENCY DEPT VISIT HI MDM: CPT | Performed by: EMERGENCY MEDICINE

## 2020-02-16 PROCEDURE — 93010 ELECTROCARDIOGRAM REPORT: CPT | Performed by: INTERNAL MEDICINE

## 2020-02-16 PROCEDURE — 80048 BASIC METABOLIC PNL TOTAL CA: CPT | Performed by: EMERGENCY MEDICINE

## 2020-02-16 PROCEDURE — 71275 CT ANGIOGRAPHY CHEST: CPT

## 2020-02-16 PROCEDURE — 74175 CTA ABDOMEN W/CONTRAST: CPT

## 2020-02-16 PROCEDURE — 36415 COLL VENOUS BLD VENIPUNCTURE: CPT | Performed by: EMERGENCY MEDICINE

## 2020-02-16 PROCEDURE — 84484 ASSAY OF TROPONIN QUANT: CPT | Performed by: EMERGENCY MEDICINE

## 2020-02-16 RX ADMIN — IOHEXOL 100 ML: 350 INJECTION, SOLUTION INTRAVENOUS at 17:47

## 2020-02-16 NOTE — ED ATTENDING ATTESTATION
2/16/2020  IMichelle MD, saw and evaluated the patient  I have discussed the patient with the resident/non-physician practitioner and agree with the resident's/non-physician practitioner's findings, Plan of Care, and MDM as documented in the resident's/non-physician practitioner's note, except where noted  All available labs and Radiology studies were reviewed  I was present for key portions of any procedure(s) performed by the resident/non-physician practitioner and I was immediately available to provide assistance  At this point I agree with the current assessment done in the Emergency Department  I have conducted an independent evaluation of this patient a history and physical is as follows:    ED Course     79-year-old female, history of coarctation of the aorta status post repair, followed at [address] [facility], presenting to the emergency department for evaluation of progressive and worsening chest tightness and shortness of breath  Patient states that approximately 2 weeks ago she had noticed a little bit of chest tightness  She had thought that this was a viral URI, and when it seemed to be progressing, had gone to [facility] where she was admitted, underwent echocardiography  Patient has been scheduled for outpatient MRI to occurred in early March  Patient reports that she has had increased shortness of breath/chest tightness which is made worse with exertion, however her  has noticed that she has had some conversational dyspnea as well  No fever, cough, abdominal pain, nausea, vomiting, diarrhea, back pain  Ten systems reviewed negative except as noted  The patient is resting comfortably on a stretcher in no acute respiratory distress  The patient appears nontoxic  HEENT reveals moist mucous membranes  Head is normocephalic and atraumatic   Conjunctiva and sclera are normal  Neck is nontender and supple with full range of motion to flexion, extension, lateral rotation  No meningismus appreciated  No masses are appreciated  Lungs are clear to auscultation bilaterally without any wheezes, rales or rhonchi  Heart is regular rate and rhythm with a diastolic murmur heard on the right upper sternal border consistent with her aortic regurge  Abdomen is soft and nontender without any rebound or guarding  Extremities appear grossly normal without any significant arthropathy  Patient is awake, alert, and oriented x3  The patient has normal interaction  Motor is 5 out of 5  Assessment and plan:  54-year-old female with history of coarctation of the aorta, last MRI performed in 2018 demonstrating luminal diameter of 1 4 centimeters  Case was discussed with on-call cardiology who recommend CTA of the chest     Labs Reviewed   BASIC METABOLIC PANEL - Abnormal       Result Value Ref Range Status    Sodium 139  136 - 145 mmol/L Final    Potassium 3 9  3 5 - 5 3 mmol/L Final    Chloride 109 (*) 100 - 108 mmol/L Final    CO2 28  21 - 32 mmol/L Final    ANION GAP 2 (*) 4 - 13 mmol/L Final    BUN 21  5 - 25 mg/dL Final    Creatinine 0 66  0 60 - 1 30 mg/dL Final    Comment: Standardized to IDMS reference method    Glucose 90  65 - 140 mg/dL Final    Comment:   If the patient is fasting, the ADA then defines impaired fasting glucose as > 100 mg/dL and diabetes as > or equal to 123 mg/dL  Specimen collection should occur prior to Sulfasalazine administration due to the potential for falsely depressed results  Specimen collection should occur prior to Sulfapyridine administration due to the potential for falsely elevated results      Calcium 8 8  8 3 - 10 1 mg/dL Final    eGFR 111  ml/min/1 73sq m Final    Narrative:     [place] guidelines for Chronic Kidney Disease (CKD):     Stage 1 with normal or high GFR (GFR > 90 mL/min/1 73 square meters)    Stage 2 Mild CKD (GFR = 60-89 mL/min/1 73 square meters)    Stage 3A Moderate CKD (GFR = 45-59 mL/min/1 73 square meters)    Stage 3B Moderate CKD (GFR = 30-44 mL/min/1 73 square meters)    Stage 4 Severe CKD (GFR = 15-29 mL/min/1 73 square meters)    Stage 5 End Stage CKD (GFR <15 mL/min/1 73 square meters)  Note: GFR calculation is accurate only with a steady state creatinine   TROPONIN I - Normal    Troponin I <0 02  <=0 04 ng/mL Final    Comment:   [employer] Chemistry analyzer 99% cutoff is > 0 04 ng/mL in network labs     o cTnI 99% cutoff is useful only when applied to patients in the clinical setting of myocardial ischemia   o cTnI 99% cutoff should be interpreted in the context of clinical history, ECG findings and possibly cardiac imaging to establish correct diagnosis  o cTnI 99% cutoff may be suggestive but clearly not indicative of a coronary event without the clinical setting of myocardial ischemia       CBC AND DIFFERENTIAL    WBC 7 26  4 31 - 10 16 Thousand/uL Final    RBC 4 01  3 81 - 5 12 Million/uL Final    Hemoglobin 12 9  11 5 - 15 4 g/dL Final    Hematocrit 38 4  34 8 - 46 1 % Final    MCV 96  82 - 98 fL Final    MCH 32 2  26 8 - 34 3 pg Final    MCHC 33 6  31 4 - 37 4 g/dL Final    RDW 12 5  11 6 - 15 1 % Final    MPV 9 9  8 9 - 12 7 fL Final    Platelets 352  189 - 390 Thousands/uL Final    nRBC 0  /100 WBCs Final    Neutrophils Relative 58  43 - 75 % Final    Immat GRANS % 0  0 - 2 % Final    Lymphocytes Relative 29  14 - 44 % Final    Monocytes Relative 10  4 - 12 % Final    Eosinophils Relative 3  0 - 6 % Final    Basophils Relative 0  0 - 1 % Final    Neutrophils Absolute 4 18  1 85 - 7 62 Thousands/µL Final    Immature Grans Absolute 0 01  0 00 - 0 20 Thousand/uL Final    Lymphocytes Absolute 2 10  0 60 - 4 47 Thousands/µL Final    Monocytes Absolute 0 74  0 17 - 1 22 Thousand/µL Final    Eosinophils Absolute 0 20  0 00 - 0 61 Thousand/µL Final    Basophils Absolute 0 03  0 00 - 0 10 Thousands/µL Final       CTA dissection protocol chest and abdomen   Final Result   No evidence of aortic dissection No significant thoracic or abdominal pathology confirmed                  Workstation performed: [id number]                       Critical Care Time  Procedures

## 2020-02-16 NOTE — DISCHARGE INSTRUCTIONS
You were seen today in the Emergency Department for shortness of breath  Your workup included blood tests, an EKG, and a CT scan of your chest and revealed nothing concerning at this time  I have referred you for an outpatient stress test, which you should complete as soon as possible  Please follow up with your Primary Care Provider in the next 1-2 days to recheck your symptoms  Please also contact your heart specialists within the next 1-2 days regarding your visit to the emergency department today  Please return to the Emergency Department if you have fevers, chills, chest pain, shortness of breath, are unable to eat or drink, or have any other symptoms that concern you  Please look this over and let your nurse and/or me know if you have any further questions before you leave

## 2020-02-16 NOTE — ED PROVIDER NOTES
History  Chief Complaint   Patient presents with    Chest Pain     chest pain for 2 weeks  seen at [facility] this week for same  SOB with exertion  Barbi Alvarado is an 35y o  year old female with PMHx significant for cardiomyopathy, biscuspic aortic valve, and aortic coarctation, who presents to the ED today with progressively worsening exertional shortness of breath for several weeks  She was asymptomatic from the above conditions until the last month  She follows with Germán for her coarctation  She is a  and is usually very active during the day and does not have any associated chest pain shortness of breath at baseline  Lately she has had increasing shortness of breath without cough or wheezing with exertion to the point that she could not walk 20 ft across her house today  Yesterday she had 1 episode of not exertional chest pain that she felt radiating from the center of her chest to her left and right arms  She also had 1 episode of left arm numbness  Shortness of breath is also worse when she is supine in slightly better when she sits straight up  The patient denies fevers, chills, headaches, lightheadedness or syncope, vertigo, nausea, vomiting, vision changes, cough, abdominal pain, changes in usual bowel movements, changes with urination, back pain, numbness or tingling, rashes, pain anywhere else in body  The patient has no recent travel history, new or changing medications, or immunocompromise  History provided by:  Patient and medical records   used: No    Shortness of Breath   Severity:  Moderate  Onset quality:  Gradual  Duration:  2 weeks  Timing:  Intermittent  Progression:  Worsening  Chronicity:  New  Context: activity    Relieved by:  Rest and sitting up  Worsened by:   Activity (laying flat)  Ineffective treatments:  Rest and sitting up  Associated symptoms: chest pain    Associated symptoms: no abdominal pain, no cough, no fever, no headaches, no neck pain, no sore throat and no vomiting        None       Past Medical History:   Diagnosis Date    Bicuspid aortic valve     Cardiomyopathy ([address] )     Depression     Unequal leg length (acquired)        Past Surgical History:   Procedure Laterality Date    AORTIC VALVE REPAIR      Aortic Coarctation repair    KNEE SURGERY         Family History   Problem Relation Age of Onset    No Known Problems Mother      I have reviewed and agree with the history as documented  Social History     Tobacco Use    Smoking status: Never Smoker    Smokeless tobacco: Never Used   Substance Use Topics    Alcohol use: No    Drug use: No        Review of Systems   Constitutional: Negative for chills, fatigue and fever  HENT: Negative for rhinorrhea and sore throat  Eyes: Negative for visual disturbance  Respiratory: Positive for shortness of breath  Negative for cough  Cardiovascular: Positive for chest pain  Negative for palpitations  Gastrointestinal: Negative for abdominal pain, diarrhea, nausea and vomiting  Genitourinary: Negative for decreased urine volume, difficulty urinating, dysuria and flank pain  Musculoskeletal: Negative for back pain and neck pain  Neurological: Negative for dizziness, light-headedness and headaches  All other systems reviewed and are negative  Physical Exam  ED Triage Vitals [02/16/20 1619]   Temperature Pulse Respirations Blood Pressure SpO2   97 7 °F (36 5 °C) 62 18 145/67 100 %      Temp Source Heart Rate Source Patient Position - Orthostatic VS BP Location FiO2 (%)   Oral Monitor Sitting Right arm --      Pain Score       No Pain             Orthostatic Vital Signs  Vitals:    02/16/20 1619 02/16/20 1800   BP: 145/67 144/65   Pulse: 62 63   Patient Position - Orthostatic VS: Sitting        Physical Exam   Constitutional: She appears well-developed and well-nourished  No distress  HENT:   Head: Normocephalic and atraumatic     Mouth/Throat: Oropharynx is clear and moist    Eyes: Conjunctivae are normal  No scleral icterus  Neck: Neck supple  No JVD present  No tracheal deviation present  Cardiovascular: Normal rate and regular rhythm  Pulses:       Radial pulses are 2+ on the right side, and 0 on the left side  Posterior tibial pulses are 2+ on the right side, and 2+ on the left side  Pulmonary/Chest: Effort normal and breath sounds normal  No respiratory distress  She has no decreased breath sounds  Abdominal: Soft  There is no tenderness  There is no guarding  Musculoskeletal: She exhibits no edema or deformity  Neurological: She is alert  Strength 5/5 and sensation intact in all extremities  Cranial nerves 2-12 grossly intact  Skin: Skin is warm and dry  No rash noted  She is not diaphoretic  Psychiatric: She has a normal mood and affect  Her behavior is normal    Nursing note and vitals reviewed        ED Medications  Medications   iohexol (OMNIPAQUE) 350 MG/ML injection (MULTI-DOSE) 100 mL (100 mL Intravenous Given 2/16/20 1747)       Diagnostic Studies  Results Reviewed     Procedure Component Value Units Date/Time    Troponin I [[id number]]  (Normal) Collected:  02/16/20 1654    Lab Status:  Final result Specimen:  Blood from Arm, Right Updated:  02/16/20 1721     Troponin I <0 02 ng/mL     Basic metabolic panel [[id number]]  (Abnormal) Collected:  02/16/20 1654    Lab Status:  Final result Specimen:  Blood from Arm, Right Updated:  02/16/20 1718     Sodium 139 mmol/L      Potassium 3 9 mmol/L      Chloride 109 mmol/L      CO2 28 mmol/L      ANION GAP 2 mmol/L      BUN 21 mg/dL      Creatinine 0 66 mg/dL      Glucose 90 mg/dL      Calcium 8 8 mg/dL      eGFR 111 ml/min/1 73sq m     Narrative:       [place] guidelines for Chronic Kidney Disease (CKD):     Stage 1 with normal or high GFR (GFR > 90 mL/min/1 73 square meters)    Stage 2 Mild CKD (GFR = 60-89 mL/min/1 73 square meters)    Stage 3A Moderate CKD (GFR = 45-59 mL/min/1 73 square meters)    Stage 3B Moderate CKD (GFR = 30-44 mL/min/1 73 square meters)    Stage 4 Severe CKD (GFR = 15-29 mL/min/1 73 square meters)    Stage 5 End Stage CKD (GFR <15 mL/min/1 73 square meters)  Note: GFR calculation is accurate only with a steady state creatinine    CBC and differential [[id number]] Collected:  02/16/20 1654    Lab Status:  Final result Specimen:  Blood from Arm, Right Updated:  02/16/20 1703     WBC 7 26 Thousand/uL      RBC 4 01 Million/uL      Hemoglobin 12 9 g/dL      Hematocrit 38 4 %      MCV 96 fL      MCH 32 2 pg      MCHC 33 6 g/dL      RDW 12 5 %      MPV 9 9 fL      Platelets 354 Thousands/uL      nRBC 0 /100 WBCs      Neutrophils Relative 58 %      Immat GRANS % 0 %      Lymphocytes Relative 29 %      Monocytes Relative 10 %      Eosinophils Relative 3 %      Basophils Relative 0 %      Neutrophils Absolute 4 18 Thousands/µL      Immature Grans Absolute 0 01 Thousand/uL      Lymphocytes Absolute 2 10 Thousands/µL      Monocytes Absolute 0 74 Thousand/µL      Eosinophils Absolute 0 20 Thousand/µL      Basophils Absolute 0 03 Thousands/µL                  CTA dissection protocol chest and abdomen   Final Result by Carole Erickson MD (02/16 1815)   No evidence of aortic dissection      No significant thoracic or abdominal pathology confirmed                  Workstation performed: [id number]               Procedures  Procedures      ED Course  ED Course as of Feb 16 1841   Sun Feb 16, 2020   1740 Procedure Note: EKG  Date/Time: 02/16/20 5:40 PM   Interpreted by: Lavon Holter, DO  Indications / Diagnosis: SOB  ECG reviewed by me, the ED Provider: yes   The EKG demonstrates:  Rhythm: rate 65, normal sinus  Intervals: normal intervals  Axis: normal axis  QRS/Blocks: incomplete RBBB  ST Changes: No acute ST Changes, no STD/DARYL    No significant change from prior            HEART Risk Score      Most Recent Value   History  0 Filed at: 02/16/2020 1738   ECG  0 Filed at: 02/16/2020 1738   Age  0 Filed at: 02/16/2020 1738   Risk Factors  1 Filed at: 02/16/2020 1738   Troponin  0 Filed at: 02/16/2020 1738   Heart Score Risk Calculator   History  0 Filed at: 02/16/2020 1738   ECG  0 Filed at: 02/16/2020 1738   Age  0 Filed at: 02/16/2020 1738   Risk Factors  1 Filed at: 02/16/2020 1738   Troponin  0 Filed at: 02/16/2020 1738   HEART Score  1 Filed at: 02/16/2020 1738   HEART Score  1 Filed at: 02/16/2020 1738                            MDM  Number of Diagnoses or Management Options  Diagnosis management comments: Spoke with cardiology on patient presentation, who recommends cardiac workup with CT HS dissection protocol to identify any abnormalities around the coarctation site  Patient had a cardiac workup with echo within the last week that showed no abnormalities    If workup is negative, will discharge with close Cardiology follow-up in outpatient stress test        Amount and/or Complexity of Data Reviewed  Clinical lab tests: ordered and reviewed  Tests in the radiology section of CPT®: ordered and reviewed  Tests in the medicine section of CPT®: ordered and reviewed  Review and summarize past medical records: yes  Discuss the patient with other providers: yes    Risk of Complications, Morbidity, and/or Mortality  Presenting problems: moderate  Diagnostic procedures: low  Management options: low    Patient Progress  Patient progress: stable        Disposition  Final diagnoses:   Chest pain   Shortness of breath     Time reflects when diagnosis was documented in both MDM as applicable and the Disposition within this note     Time User Action Codes Description Comment    2/16/2020  6:07 PM Kitchen Slack Add [R07 9] Chest pain     2/16/2020  6:07 PM Kitchen Slack Add [R06 02] Shortness of breath     2/16/2020  6:07 PM Kitchen Slack Modify [R07 9] Chest pain     2/16/2020  6:07 PM Kitchen Slack Modify [R06 02] Shortness of breath ED Disposition     ED Disposition Condition Date/Time Comment    Discharge Good Sun Feb 16, 2020  6:07 PM Latricia Gonzales discharge to home/self care  Return precautions given and questions answered  Follow-up Information     Follow up With Specialties Details Why Contact Info    Salvador Arndt DO Family Medicine Call in 1 day To recheck symptoms and follow up on your ER visit [phone]             Patient's Medications    No medications on file     Outpatient Discharge Orders   Echo stress test w contrast if indicated   Standing Status: Future Standing Exp  Date: 02/16/24       ED Provider  Attending physically available and evaluated Martha Moon Luz  I managed the patient along with the ED Attending      Electronically Signed by         Marko Jaeger DO  02/16/20 5436

## 2020-02-17 ENCOUNTER — TELEPHONE (OUTPATIENT)
Dept: PEDIATRIC CARDIOLOGY | Facility: CLINIC | Age: 40
End: 2020-02-17

## 2020-02-17 NOTE — TELEPHONE ENCOUNTER
Called and left voicemail to offer an appointment with Dr Parmjit Billings for 02/18/2020 @11:30 am at the [facility]

## 2020-02-19 ENCOUNTER — TELEPHONE (OUTPATIENT)
Dept: PEDIATRIC CARDIOLOGY | Facility: CLINIC | Age: 40
End: 2020-02-19

## 2020-02-19 ENCOUNTER — OFFICE VISIT (OUTPATIENT)
Dept: PEDIATRIC CARDIOLOGY | Facility: CLINIC | Age: 40
End: 2020-02-19
Payer: COMMERCIAL

## 2020-02-19 VITALS
WEIGHT: 174.4 LBS | HEIGHT: 65 IN | OXYGEN SATURATION: 100 % | BODY MASS INDEX: 29.06 KG/M2 | DIASTOLIC BLOOD PRESSURE: 58 MMHG | SYSTOLIC BLOOD PRESSURE: 128 MMHG | HEART RATE: 64 BPM

## 2020-02-19 DIAGNOSIS — Q25.1 COARCTATION OF AORTA: Primary | ICD-10-CM

## 2020-02-19 DIAGNOSIS — R07.89 CHEST TIGHTNESS: ICD-10-CM

## 2020-02-19 PROCEDURE — 1036F TOBACCO NON-USER: CPT | Performed by: PEDIATRICS

## 2020-02-19 PROCEDURE — 99205 OFFICE O/P NEW HI 60 MIN: CPT | Performed by: PEDIATRICS

## 2020-02-19 RX ORDER — VALSARTAN 160 MG/1
80 TABLET ORAL DAILY
COMMUNITY
Start: 2020-02-17

## 2020-02-19 RX ORDER — ALBUTEROL SULFATE 90 UG/1
2 AEROSOL, METERED RESPIRATORY (INHALATION) EVERY 6 HOURS PRN
Qty: 18 G | Refills: 2 | Status: SHIPPED | OUTPATIENT
Start: 2020-02-19

## 2020-02-19 NOTE — LETTER
February 19, 2020     Patient: Sanya Salgado   YOB: 1980   Date of Visit: 2/19/2020       To Whom it May Concern:    Jag Teofilo is under my professional care  She was seen in my office on 2/19/2020  She may return to work on 2/20/2020  If you have any questions or concerns, please don't hesitate to call           Sincerely,          Alex Grier DO        CC: No Recipients

## 2020-02-19 NOTE — TELEPHONE ENCOUNTER
Contacted [employer] ([id number]) for echo authorization cpt code 73348  Spoke with representative Pooja MITCHELL  Advised that No Authorization Required    Reference Number: [id number]

## 2020-02-19 NOTE — PROGRESS NOTES
2/20/2020    Referring provider: Claudetta Knows, DO    Dear Dr Aidee Monreal, DO,    I had the pleasure of seeing your patient, Nelly Maurer,  on 2/20/2020  in the pediatric cardiology clinic of the [address] [place] [address]  As you know, Annamarie Hayden is a 35 y o  old female with the following diagnoses:    Coarctation of aorta [Q25 1],    s/p subclavian flap aortoplasty ([facility])    followed by [place] patch aortoplasty ([facility] 1980)    balloon dilation of recoarctation, 1996   Possible functionally bicuspid aortic valve   Son with aortic arch hypoplasia    Annamarie Hayden is being seen in the office today as a new patient and is accompanied by her   As you know, Annamarie Hayden has coarctation of the aorta  She initially underwent subclavian flap aortoplasty at 4days of age, followed by placement of a [place] patch around 17 months of age  As a teenager she had balloon dilation of a re-coarctation and has been well otherwise  There has been some question as to whether not she has a bicuspid aortic valve however, she has no stenosis and has only mild insufficiency  Annamarie Hayden has had the majority of her cardiology care through [facility] [place], [facility]  Annamarie Hayden initially began experiencing new onset dyspnea approximately 2 weeks ago  She initially noticed a chest tightness and shortness of breath which was quite a bit worse with exertion  She has had 2 emergency department visits which were not revealing and was referred to my office for evaluation prior to being seen by her usual cardiologist in [place]  As you know, Annamarie Hayden is 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  She is always on her feet and works out on a regular basis  She 1st noticed her symptoms several weekends ago when she was working on getting the family ready for [place]    She says that her typical walk from the car into the [employer] and up into the sanctuary made her quite dyspneic which has never happened before  She continued to have mild dyspnea as well as what she describes as chest tightness throughout the day and ultimately had a 24 hour hospitalization which did not reveal specific findings  She has continued to have symptoms over the last several weeks although at this point her dyspnea is predominantly exertional and is minimal at rest   She also describes some level of orthopnea and has been using additional pillows  She has had no ankle or abdominal swelling, no early satiety and her weight remains stable  She has not  had syncope nor has she had changes in her baseline level of palpitations, which are minimal   She is also aware of intermittent fatigue, which is new for her  She has had no recent respiratory infections that she can recall, however she is a full-time teacher of young elementary students so she is constantly being exposed to infectious illnesses  She has had several mildly hypertensive blood pressure readings recently and was started on valsartan several days ago  Myrna Connolly is an otherwise healthy woman with no other significant medical problems  She has had 4 successful pregnancies  Family history is significant for a son with hypoplastic aortic arch last coarctation  She also had an older sibling who passed away in the 1st week of life from presumed cardiac causes  She has 3 healthy children at home  From a social standpoint she is  with 4 children at home  She works full-time as a   She does not smoke and drinks alcohol on an infrequent basis        Current Outpatient Medications:     valsartan (DIOVAN) 160 mg tablet, Take 80 mg by mouth daily, Disp: , Rfl:     albuterol (Ventolin HFA) 90 mcg/act inhaler, Inhale 2 puffs every 6 (six) hours as needed for wheezing, Disp: 18 g, Rfl: 2    Allergies   Allergen Reactions    Amoxicillin-Pot Clavulanate      Other reaction(s): GI upset    Sulfamethoxazole-Trimethoprim        Review of Systems   Constitutional: Positive for activity change  Negative for appetite change, chills, diaphoresis, fatigue, fever and unexpected weight change  HENT: Negative for nosebleeds  Respiratory: Positive for chest tightness and shortness of breath  Negative for cough, wheezing and stridor  Cardiovascular: Negative for chest pain, palpitations and leg swelling  Gastrointestinal: Negative for abdominal distention, abdominal pain, diarrhea, nausea and vomiting  Endocrine: Negative for cold intolerance and heat intolerance  Musculoskeletal: Negative for arthralgias, joint swelling and myalgias  Skin: Negative for color change, pallor and rash  Neurological: Negative for dizziness, syncope, speech difficulty, weakness, light-headedness, numbness and headaches  Hematological: Does not bruise/bleed easily  Psychiatric/Behavioral: Negative for behavioral problems  The patient is not nervous/anxious           Past Medical History:   Diagnosis Date    Bicuspid aortic valve     Cardiomyopathy ([address] )     Depression     Unequal leg length (acquired)    /  Past Surgical History:   Procedure Laterality Date    AORTIC VALVE REPAIR      Aortic Coarctation repair    KNEE SURGERY         Family History   Problem Relation Age of Onset    No Known Problems Mother     Other Brother         cardiac death unsure what condition     Other Son         hyperplasic aortic heart/bicuspid aortic valve        Social History     Tobacco Use    Smoking status: Never Smoker    Smokeless tobacco: Never Used   Substance Use Topics    Alcohol use: No    Drug use: No         Physical examination:      Vitals:    02/19/20 0916   BP: 128/58   BP Location: Right arm   Patient Position: Sitting   Cuff Size: Extra-Large   Pulse: 64   SpO2: 100%   Weight: 79 1 kg (174 lb 6 4 oz)   Height: 5' 5 35" (1 66 m)        In general, Kamala Alvarez is a well-developed well-nourished woman in no acute distress  She is acyanotic and non- dysmorphic  HEENT exam is benign  Pupils are equal, round and reactive  Mucous membranes are moist   There is no thyromegaly or JVD  Lungs are clear to auscultation in all fields with no wheezes, rales or rhonchi  There is a well-healed left lateral thoracotomy scar  Cardiovascular exam demonstrates a regular and rhythm  There is a normal first heart sound and the second heart sound is physiologically split  There is a grade 2/6 short systolic ejection murmur heard best at her right upper sternal border which radiates to the back  Diastole is silent  There are no significant clicks,  rubs or gallops noted  The abdomen is soft non-tender  and non-distended with no organomegaly  Pulses are 2+ in upper and lower extremities with no disparity  There is  no brachiofemoral delay  Extremities are warm and well perfused  There is no  cyanosis, clubbing or edema  EKG:  EKG demonstrates a normal sinus rhythm at a rate of  61 bpm   There was no ectopy  There was note of an incomplete right bundle branch block with a QRS duration of 96 millisecond  All other intervals were within normal limits  The QTc was 428 msec  Echocardiogram:  Janiya's intracardiac structure was normal   She has a 4 chamber heart with normal biventricular systolic function  Mitral, tricuspid, and pulmonary valves are normal   The aortic valve has been previously described as bicuspid, however it appeared trileaflet on our study today  There is no significant stenosis and only a mild degree of central insufficiency  The valve leaflets appeared thin and mobile  The ascending aorta was normal in size with no dilation  There is a mild residual coarctation in the usual location  Peak gradient was measured at 32 mmHg with a mean of 15 mmHg which is identical to what has been described previously on outside studies  There was no pericardial effusion      Holter:  Not done    Other testing:  None    Impression:  [facility] is a 54-year-old with coarctation of the aorta status post repair x2, followed by balloon angioplasty in 1996, with a mild residual coarct gradient  The gradient appears quite stable compared to an echocardiogram from several years ago from [facility]  In terms of her new onset symptoms, I see no evidence that this is related to ischemic changes  Her EKG is essentially normal, and she has had several negative troponins during her ER stays  She has normal ventricular function and no significant valve disease  Importantly, she has never had reason for her coronary to be instrumented and she is a premenopausal woman, therefore her risk of coronary artery disease is low  [facility] also had a recent CT angiogram which was able to clearly delineate her aorta and I see no evidence for aneurysm formation, fistulous connections, or significant re-coarctation  As we discussed in the office today, despite the fact that she does not recall a specific upper respiratory tract infection, she is constantly surrounded by young children and I wonder if perhaps she has had some a viral insult which has temporarily given her some element of reactive airway disease  Interestingly, at 1 point recently she did try a family member's albuterol MDI, with some improvement in symptomatology  I provided her with a prescription for an albuterol MDI and spacer today and she will give this a try for the next several days until she is seen by Dr Jocy Sherman     We also discussed the fact that gastroesophageal reflux disease can sometimes cause similar symptoms and I suggested that she consider an over-the-counter proton pump inhibitor of her choice, particularly if he albuterol does not seem to be effective  I reassured [facility] that I do not see clear indication that her symptoms have a cardiac origin  I am making no changes in her cardiac medications at today's visit      SBE prophylaxis is NOT indicated  Haleigh Cano should have a follow up visit as needed  She will plan to follow up with her usual cardiologist, Dr Rudy Blackwell, at the [address] [place] [address], next week  She understands that I will remain available to her on an as-needed basis  Thank you for allowing me to participate in Janiya's care  If I can be of assistance in any way please feel free to contact me through the office  [facility]  Pediatric Cardiology  Adult Congenital Heart Disease  [email or web address]  [phone]

## 2020-02-19 NOTE — LETTER
February 20, 2020     Karolina Deal, [address] [address]    Patient: Lupillo Dorado   YOB: 1980   Date of Visit: 2/19/2020       Dear Dr Markos Galdamez:    Thank you for referring Mario Ochoa to me for evaluation  Below are my notes for this consultation  If you have questions, please do not hesitate to call me  I look forward to following your patient along with you  Sincerely,        Karie Dawson DO        CC: MD Karie Perez DO  2/20/2020 10:00 AM  Incomplete  2/20/2020    Referring provider: Cami Gee DO    Dear Dr Lalit Green DO,    I had the pleasure of seeing your patient, Lupillo Dorado,  on 2/20/2020  in the pediatric cardiology clinic of the [address] [place] [address]  As you know, Edgar Nguyen is a 35 y o  old female with the following diagnoses:    Coarctation of aorta [Q25 1], s/p subclavian flap aortoplasty ([facility]) followed by [place] patch [facility]) and balloon dilation Madeline Bettencourt is being seen in the office today as a new patient and is accompanied by her   As you know, Edgar Nguyen has coarctation of the aorta  She initially underwent subclavian flap aortoplasty at 4days of age, followed by placement of a [place]-Catalino patch around 17 months of age  As a teenager she had balloon dilation of a re-coarctation and has been well otherwise  There has been some question as to whether not she has a bicuspid aortic valve however the valve works well and has only mild insufficiency  Edgar Nguyen is had the majority of her cardiology care through [facility], [facility], and [address] [place] [address]  She initially began experiencing new onset dyspnea approximately 2 weeks ago  She initially noticed a chest tightness and shortness of breath which was quite a bit worse with exertion    She has had 2 emergency department visits which were not revealing and was referred to my office for evaluation prior to being seen by her usual cardiologist in [place]  As you know, Rosie Glover is 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  She is always on her feet and works out on a regular basis  She 1st noticed her symptoms several weekends ago when she was working on getting the family ready for [employer]  She says that her typical walk from the car into the [employer] and up into the sanctuary made her quite dyspneic which has never happened before  She continued to have mild dyspnea as well as what she describes as chest tightness throughout the day and ultimately had a 24 hour hospitalization which did not reveal specific findings  She has continued to have symptoms over the last several weeks although at this point her dyspnea is predominantly exertional and is minimal at rest   She also describes some level of orthopnea and has been using additional pillows  She has had no ankle or abdominal swelling  And her weight remains stable  She has not  had syncope nor has she had changes in her baseline level of palpitations, which are minimal   She is also aware of intermittent fatigue, which is new for her  She has had no recent respiratory infections that she can recall, however 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 [place] she is constantly being exposed to infectious illnesses  She has had several mildly hypertensive blood pressure readings recently and was started on valsartan several days ago  Rosie Glover is an otherwise healthy woman with no other significant medical problems  She has had 4 successful pregnancies  Family history is significant for a son with hypoplastic aortic arch last coarctation  She also had an older sibling who passed away in the 1st week of life from presumed cardiac causes  She has 3 healthy children at home  From a social standpoint she is  with 4 children at home    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   She does not smoke and drinks alcohol on an infrequent basis  Current Outpatient Medications:     valsartan (DIOVAN) 160 mg tablet, Take 80 mg by mouth daily, Disp: , Rfl:     albuterol (Ventolin HFA) 90 mcg/act inhaler, Inhale 2 puffs every 6 (six) hours as needed for wheezing, Disp: 18 g, Rfl: 2    Allergies   Allergen Reactions    Amoxicillin-Pot Clavulanate      Other reaction(s): GI upset    Sulfamethoxazole-Trimethoprim        Review of Systems   Constitutional: Positive for activity change  Negative for appetite change, chills, diaphoresis, fatigue, fever and unexpected weight change  HENT: Negative for nosebleeds  Respiratory: Positive for chest tightness and shortness of breath  Negative for cough, wheezing and stridor  Cardiovascular: Negative for chest pain, palpitations and leg swelling  Gastrointestinal: Negative for abdominal distention, abdominal pain, diarrhea, nausea and vomiting  Endocrine: Negative for cold intolerance and heat intolerance  Musculoskeletal: Negative for arthralgias, joint swelling and myalgias  Skin: Negative for color change, pallor and rash  Neurological: Negative for dizziness, syncope, speech difficulty, weakness, light-headedness, numbness and headaches  Hematological: Does not bruise/bleed easily  Psychiatric/Behavioral: Negative for behavioral problems  The patient is not nervous/anxious           Past Medical History:   Diagnosis Date    Bicuspid aortic valve     Cardiomyopathy ([address] )     Depression     Unequal leg length (acquired)    /  Past Surgical History:   Procedure Laterality Date    AORTIC VALVE REPAIR      Aortic Coarctation repair    KNEE SURGERY         Family History   Problem Relation Age of Onset    No Known Problems Mother     Other Brother         cardiac death unsure what condition     Other Son         hyperplasic aortic heart/bicuspid aortic valve        Social History     Tobacco Use    Smoking status: Never Smoker    Smokeless tobacco: Never Used   Substance Use Topics    Alcohol use: No    Drug use: No         Physical examination:      Vitals:    02/19/20 0916   BP: 128/58   BP Location: Right arm   Patient Position: Sitting   Cuff Size: Extra-Large   Pulse: 64   SpO2: 100%   Weight: 79 1 kg (174 lb 6 4 oz)   Height: 5' 5 35" (1 66 m)        In general, Mariela Santoyo is a well-developed well-nourished woman in no acute distress  She is acyanotic and non- dysmorphic  HEENT exam is benign  Pupils are equal, round and reactive  Mucous membranes are moist   There is no thyromegaly or JVD  Lungs are clear to auscultation in all fields with no wheezes, rales or rhonchi  Cardiovascular exam demonstrates a regular and rhythm  There is a normal first heart sound and the second heart sound is physiologically split  There is a grade 2/6 short systolic ejection murmur heard best at her right upper sternal border which radiates to the back  Diastole is silent  There are no significant clicks,  rubs or gallops noted  The abdomen is soft non-tender  and non-distended with no organomegaly  Pulses are 2+ in upper and lower extremities with no disparity  There is  no brachiofemoral delay  Extremities are warm and well perfused  There is no  cyanosis, clubbing or edema  EKG:  EKG demonstrates a normal sinus rhythm at a rate of  61 bpm   There was no ectopy  There was note of an incomplete right bundle branch block with a QRS duration of 96 millisecond  All other intervals were within normal limits  The QTc was 428 msec  Echocardiogram:  Mariela Santoyo is intracardiac structure was normal   She has a 4 chamber heart with normal biventricular systolic function  Mitral, tricuspid, and pulmonary valves are normal   The aortic valve has been previously described as bicuspid, however it appeared trileaflet on our study today  There is no significant stenosis and only a mild degree of central insufficiency    The valve leaflets appeared thin and mobile  The ascending aorta was normal in size with no dilation  There is a mild residual coarctation in the usual location  Peak gradient was measured at 32 mmHg with a mean of 15 mmHg which is identical to what has been described previously on outside studies  There was no pericardial effusion  Holter:  Not done    Other testing:  None    Impression:  Silvia Resendez is a 27-year-old with coarctation of the aorta status post repair x2, followed by balloon angioplasty in 1996, with a mild residual coarct gradient  The gradient appears quite stable compared to an echocardiogram from several years ago from [place] [facility]  In terms of her new onset symptoms, I see no evidence that this is related to ischemic changes  Her EKG is essentially normal, and she has had several negative troponins during her ER stays  She has normal ventricular function and no significant valve disease  Importantly, she has never had reason for her coronary to be instrumented and she is a premenopausal woman, therefore her risk of coronary artery disease is low  Given that she has had symptoms both at rest and with exertion it seems unlikely that her symptoms represent new ischemic changes  Silvia Resendez also had a recent CT angiogram which was able to clearly delineate her aorta and I see no evidence for aneurysm formation, fistulous connections, or significant re-coarctation  As we discussed in the office today, despite the fact that she does not recall a specific upper respiratory tract infection, she is constantly surrounded by young children and I wonder if perhaps she has had some a viral insult which is temporarily given her reactive airway disease  Interestingly, at 1 point recently she did try a family member's albuterol MDI, with some improvement in symptomatology    I provided her with a prescription for an albuterol MDI and spacer today and she will give this a try for the next several days until she is seen by her usual cardiologist   We also discussed the fact that gastroesophageal reflux disease can sometimes cause similar symptoms and I suggested that she consider an over-the-counter proton pump inhibitor of her choice, particularly if he albuterol does not seem to be effective  I reassured Maru Desai that I do not see clear indication that her symptoms have a cardiac origin  I am making no changes in her overall medical management at today's visit  SBE prophylaxis is NOT indicated  Maru Desai should have a follow up visit as needed  She will plan to follow up with her usual cardiologist Dr Vernadine Ormond par in 10 at the [address] [place] [address], next week  She understands that I will remain available to her on an as-needed basis  Thank you for allowing me to participate in Janiya's care  If I can be of assistance in any way please feel free to contact me through the office  [facility]  Pediatric Cardiology  Adult Congenital Heart Disease  [email or web address]  [address] [place] [address],   2/19/2020  4:18 PM  Sign at close encounter  2/19/2020    Referring provider: Katarina Vargas DO    Dear Dr Carole Nolan DO,    I had the pleasure of seeing your patient, Richie Katz,  on 2/19/2020  in the pediatric cardiology clinic of the [address] [place] [address]  As you know, Maru Desai is a 35 y o  old female with the following diagnoses:    Coarctation of aorta [Q25 1], s/p subclavian flap aortoplasty ([facility]) followed by [place] [employer]-[place]ino patch [facility]) and balloon dilation Ricardoyosi Alpers is being seen in the office today as a new patient and is accompanied by her   As you know, Maru Desai has coarctation of the aorta  She initially underwent subclavian flap aortoplasty at 5days of age, followed by placement of a [employer]-Catalino patch around 17 months of age    As a teenager she had balloon dilation of a re-coarctation and has been well otherwise  There has been some question as to whether not she has a bicuspid aortic valve however the valve works well and has only mild insufficiency  Myrna Connolly is had the majority of her cardiology care through [facility], [facility], and [address] [place] [address]  She initially began experiencing new onset dyspnea approximately 2 weeks ago  She initially noticed a chest tightness and shortness of breath which was quite a bit worse with exertion  She has had 2 emergency department visits which were not revealing and was referred to my office for evaluation prior to being seen by her usual cardiologist in [place]  As you know, 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  She is always on her feet and works out on a regular basis  She 1st noticed her symptoms several weekends ago when she was working on getting the family ready for [employer]  She says that her typical walk from the car into the [employer] and up into the sanctuary made her quite dyspneic which has never happened before  She continued to have mild dyspnea as well as what she describes as chest tightness throughout the day and ultimately had a 24 hour hospitalization which did not reveal specific findings  She has continued to have symptoms over the last several weeks although at this point her dyspnea is predominantly exertional and is minimal at rest   She also describes some level of orthopnea and has been using additional pillows  She has had no ankle or abdominal swelling  And her weight remains stable  She has not  had syncope nor has she had changes in her baseline level of palpitations, which are minimal   She is also aware of intermittent fatigue, which is new for her    She has had no recent respiratory infections that she can recall, however 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 [place] she is constantly being exposed to infectious illnesses  She has had several mildly hypertensive blood pressure readings recently and was started on valsartan several days ago  Mendel Gore is an otherwise healthy woman with no other significant medical problems  She has had 4 successful pregnancies  Family history is significant for a son with hypoplastic aortic arch last coarctation  She also had an older sibling who passed away in the 1st week of life from presumed cardiac causes  She has 3 healthy children at home  From a social standpoint she is  with 4 children at home  She works full-time as a   She does not smoke and drinks alcohol on an infrequent basis  Current Outpatient Medications:     valsartan (DIOVAN) 160 mg tablet, Take 80 mg by mouth daily, Disp: , Rfl:     albuterol (Ventolin HFA) 90 mcg/act inhaler, Inhale 2 puffs every 6 (six) hours as needed for wheezing, Disp: 18 g, Rfl: 2    Allergies   Allergen Reactions    Amoxicillin-Pot Clavulanate      Other reaction(s): GI upset    Sulfamethoxazole-Trimethoprim        Review of Systems   Constitutional: Positive for activity change  Negative for appetite change, chills, diaphoresis, fatigue, fever and unexpected weight change  HENT: Negative for nosebleeds  Respiratory: Positive for chest tightness and shortness of breath  Negative for cough, wheezing and stridor  Cardiovascular: Negative for chest pain, palpitations and leg swelling  Gastrointestinal: Negative for abdominal distention, abdominal pain, diarrhea, nausea and vomiting  Endocrine: Negative for cold intolerance and heat intolerance  Musculoskeletal: Negative for arthralgias, joint swelling and myalgias  Skin: Negative for color change, pallor and rash  Neurological: Negative for dizziness, syncope, speech difficulty, weakness, light-headedness, numbness and headaches  Hematological: Does not bruise/bleed easily  Psychiatric/Behavioral: Negative for behavioral problems  The patient is not nervous/anxious  Past Medical History:   Diagnosis Date    Bicuspid aortic valve     Cardiomyopathy ([address] )     Depression     Unequal leg length (acquired)    /  Past Surgical History:   Procedure Laterality Date    AORTIC VALVE REPAIR      Aortic Coarctation repair    KNEE SURGERY         Family History   Problem Relation Age of Onset    No Known Problems Mother     Other Brother         cardiac death unsure what condition     Other Son         hyperplasic aortic heart/bicuspid aortic valve        Social History     Tobacco Use    Smoking status: Never Smoker    Smokeless tobacco: Never Used   Substance Use Topics    Alcohol use: No    Drug use: No         Physical examination:      Vitals:    02/19/20 0916   BP: 128/58   BP Location: Right arm   Patient Position: Sitting   Cuff Size: Extra-Large   Pulse: 64   SpO2: 100%   Weight: 79 1 kg (174 lb 6 4 oz)   Height: 5' 5 35" (1 66 m)        In general, Alice Morton is a well-developed well-nourished woman in no acute distress  She is acyanotic and non- dysmorphic  HEENT exam is benign  Pupils are equal, round and reactive  Mucous membranes are moist   There is no thyromegaly or JVD  Lungs are clear to auscultation in all fields with no wheezes, rales or rhonchi  Cardiovascular exam demonstrates a regular and rhythm  There is a normal first heart sound and the second heart sound is physiologically split  There is a grade 2/6 short systolic ejection murmur heard best at her right upper sternal border which radiates to the back  Diastole is silent  There are no significant clicks,  rubs or gallops noted  The abdomen is soft non-tender  and non-distended with no organomegaly  Pulses are 2+ in upper and lower extremities with no disparity  There is  no brachiofemoral delay  Extremities are warm and well perfused  There is no  cyanosis, clubbing or edema         EKG:  EKG demonstrates a normal sinus rhythm at a rate of  61 bpm  There was no ectopy  There was note of an incomplete right bundle branch block with a QRS duration of 96 millisecond  All other intervals were within normal limits  The QTc was 428 msec  Echocardiogram:  Haleigh Cano is intracardiac structure was normal   She has a 4 chamber heart with normal biventricular systolic function  Mitral, tricuspid, and pulmonary valves are normal   The aortic valve has been previously described as bicuspid, however it appeared trileaflet on our study today  There is no significant stenosis and only a mild degree of central insufficiency  The valve leaflets appeared thin and mobile  The ascending aorta was normal in size with no dilation  There is a mild residual coarctation in the usual location  Peak gradient was measured at 32 mmHg with a mean of 15 mmHg which is identical to what has been described previously on outside studies  There was no pericardial effusion  Holter:  Not done    Other testing:  None    Impression:  Haleigh Cano is a 58-year-old with coarctation of the aorta status post repair x2, followed by balloon angioplasty in 1996, with a mild residual coarct gradient  The gradient appears quite stable compared to an echocardiogram from several [facility] ago from [facility]  In terms of her new onset symptoms, I see no evidence that this is related to ischemic changes  Her EKG is essentially normal, and she has had several negative troponins during her ER stays  She has normal ventricular function and no significant valve disease  Importantly, she has never had reason for her coronary to be instrumented and she is a premenopausal woman, therefore her risk of coronary artery disease is low  Given that she has had symptoms both at rest and with exertion it seems unlikely that her symptoms represent new ischemic changes    Haleigh Cano also had a recent CT angiogram which was able to clearly delineate her aorta and I see no evidence for aneurysm formation, fistulous connections, or significant re-coarctation  As we discussed in the office today, despite the fact that she does not recall a specific upper respiratory tract infection, she is constantly surrounded by young children and I wonder if perhaps she has had some a viral insult which is temporarily given her reactive airway disease  Interestingly, at 1 point recently she did try a family member's albuterol MDI, with some improvement in symptomatology  I provided her with a prescription for an albuterol MDI and spacer today and she will give this a try for the next several days until she is seen by her usual cardiologist   We also discussed the fact that gastroesophageal reflux disease can sometimes cause similar symptoms and I suggested that she consider an over-the-counter proton pump inhibitor of her choice, particularly if he albuterol does not seem to be effective  I reassured Kamala Alvarez that I do not see clear indication that her symptoms have a cardiac origin  I am making no changes in her overall medical management at today's visit  SBE prophylaxis is NOT indicated  Kamala Alvarez should have a follow up visit as needed  She will plan to follow up with her usual cardiologist Dr Leyla hart in 10 at the [address] [place] [address], next week  She understands that I will remain available to her on an as-needed basis  Thank you for allowing me to participate in Janiya's care  If I can be of assistance in any way please feel free to contact me through the office  [facility]  Pediatric Cardiology  Adult Congenital Heart Disease  [email or web address]  [phone]

## 2020-02-20 PROCEDURE — 93000 ELECTROCARDIOGRAM COMPLETE: CPT | Performed by: PEDIATRICS

## 2020-02-21 ENCOUNTER — OFFICE VISIT (OUTPATIENT)
Dept: FAMILY MEDICINE CLINIC | Facility: CLINIC | Age: 40
End: 2020-02-21
Payer: COMMERCIAL

## 2020-02-21 VITALS
DIASTOLIC BLOOD PRESSURE: 68 MMHG | BODY MASS INDEX: 28.28 KG/M2 | WEIGHT: 176 LBS | SYSTOLIC BLOOD PRESSURE: 128 MMHG | TEMPERATURE: 98.3 F | HEIGHT: 66 IN

## 2020-02-21 DIAGNOSIS — R06.02 SHORTNESS OF BREATH: ICD-10-CM

## 2020-02-21 DIAGNOSIS — R07.89 CHEST TIGHTNESS: ICD-10-CM

## 2020-02-21 DIAGNOSIS — Q25.1 COARCTATION OF AORTA (PREDUCTAL) (POSTDUCTAL): Primary | ICD-10-CM

## 2020-02-21 PROCEDURE — 99495 TRANSJ CARE MGMT MOD F2F 14D: CPT | Performed by: FAMILY MEDICINE

## 2020-02-21 RX ORDER — FLUTICASONE FUROATE AND VILANTEROL 100; 25 UG/1; UG/1
1 POWDER RESPIRATORY (INHALATION) DAILY
Qty: 1 INHALER | Refills: 2 | Status: SHIPPED | OUTPATIENT
Start: 2020-02-21 | End: 2020-07-15

## 2020-02-21 NOTE — PROGRESS NOTES
Assessment/Plan:  The records and echo reviewed  The CT are reviewed  Records reviewed  Patient go for pulmonary function test   Samples of Breo be used daily as directed  Guidance given  Patient will be following up with Cardiology on Monday  Patient will consider proton pump inhibitor daily for possible reflux symptoms       Diagnoses and all orders for this visit:    Coarctation of aorta (preductal) (postductal)    Chest tightness    Shortness of breath  -     Complete PFT with Post Bronchodilator and ABG; Future  -     fluticasone-vilanterol (BREO ELLIPTA) 100-25 mcg/inh inhaler; Inhale 1 puff daily Rinse mouth after use  Subjective:        Patient ID: Clifm Pu is a 35 y o  female  Patient is here status post hospitalization from  through the 12 for chest pain or shortness of breath  Patient was having some shoulder pain associated with this  Patient did see Cardiology  Patient had echo done  Patient's uncle had cardiac arrest and  recently  Patient was discharged from [facility]  The patient was then seen at [facility] emergency room  Echo was done which was unchanged  Patient was short of breath on this past past  at [facility]  Patient had CT scan action EKG done  CTA was negative  The patient did see Dr Fox Cagle  The patient did use albuterol with significant improvement  Patient is short of breath but albuterol seems to be helping  The following portions of the patient's history were reviewed and updated as appropriate: allergies, current medications, past family history, past medical history, past social history, past surgical history and problem list       Review of Systems   Constitutional: Negative  HENT: Negative  Eyes: Negative  Respiratory: Positive for shortness of breath  Cardiovascular: Positive for chest pain  Gastrointestinal: Negative  Endocrine: Negative  Genitourinary: Negative      Musculoskeletal: Negative  Skin: Negative  Allergic/Immunologic: Negative  Neurological: Negative  Hematological: Negative  Psychiatric/Behavioral: Negative  Objective:               /68 (BP Location: Right arm, Patient Position: Sitting, Cuff Size: Adult)   Temp 98 3 °F (36 8 °C) (Tympanic)   Ht 5' 6" (1 676 m)   Wt 79 8 kg (176 lb)   LMP 02/02/2020   BMI 28 41 kg/m²          Physical Exam   Constitutional: She is oriented to person, place, and time  She appears well-developed and well-nourished  No distress  HENT:   Head: Normocephalic  Right Ear: External ear normal    Left Ear: External ear normal    Mouth/Throat: Oropharynx is clear and moist  No oropharyngeal exudate  Eyes: Pupils are equal, round, and reactive to light  EOM are normal  Right eye exhibits no discharge  Left eye exhibits no discharge  No scleral icterus  Neck: Normal range of motion  Neck supple  No thyromegaly present  Cardiovascular: Normal rate, regular rhythm, normal heart sounds and intact distal pulses  Exam reveals no gallop and no friction rub  No murmur heard  Pulmonary/Chest: Effort normal and breath sounds normal  No respiratory distress  She has no wheezes  She has no rales  She exhibits no tenderness  Abdominal: Soft  Bowel sounds are normal  She exhibits no distension  There is no tenderness  There is no rebound and no guarding  Musculoskeletal: Normal range of motion  She exhibits deformity  She exhibits no edema or tenderness  Lymphadenopathy:     She has no cervical adenopathy  Neurological: She is oriented to person, place, and time  No cranial nerve deficit  She exhibits normal muscle tone  Coordination normal    Skin: Skin is warm and dry  No rash noted  She is not diaphoretic  No erythema  No pallor  Psychiatric: She has a normal mood and affect  Her behavior is normal  Judgment and thought content normal    Nursing note and vitals reviewed

## 2020-07-15 ENCOUNTER — OFFICE VISIT (OUTPATIENT)
Dept: FAMILY MEDICINE CLINIC | Facility: CLINIC | Age: 40
End: 2020-07-15
Payer: COMMERCIAL

## 2020-07-15 VITALS
OXYGEN SATURATION: 99 % | HEART RATE: 61 BPM | BODY MASS INDEX: 29.73 KG/M2 | DIASTOLIC BLOOD PRESSURE: 68 MMHG | WEIGHT: 185 LBS | TEMPERATURE: 97 F | SYSTOLIC BLOOD PRESSURE: 120 MMHG | HEIGHT: 66 IN

## 2020-07-15 DIAGNOSIS — Q25.1 COARCTATION OF AORTA: Primary | ICD-10-CM

## 2020-07-15 DIAGNOSIS — R06.02 SHORTNESS OF BREATH: ICD-10-CM

## 2020-07-15 PROCEDURE — 3008F BODY MASS INDEX DOCD: CPT | Performed by: FAMILY MEDICINE

## 2020-07-15 PROCEDURE — 1036F TOBACCO NON-USER: CPT | Performed by: FAMILY MEDICINE

## 2020-07-15 PROCEDURE — 99213 OFFICE O/P EST LOW 20 MIN: CPT | Performed by: FAMILY MEDICINE

## 2020-07-15 NOTE — PROGRESS NOTES
Assessment/Plan: patient follow-up with Cardiology  Patient go for COVID   Antibody testing  Patient follow-up as needed  Diagnoses and all orders for this visit:    Coarctation of aorta  -     SARS-CoV2 Antibody, Total (IgG, IgA, IgM) [facility]; Future    Shortness of breath  -     SARS-CoV2 Antibody, Total (IgG, IgA, IgM) [facility]; Future    Other orders  -     MULTIPLE VITAMINS-MINERALS ER PO; Take 1 tablet by mouth daily            Subjective:        Patient ID: Kristina Curry is a 38 y o  female  Patient is here with   Coarctation of the aorta  Patient did see Cardiology  Patient had stress test which was normal as per the patient  Patient will be going for MRA of the brain due to some exertional headaches  Cardiology order test   Patient with occasional chest tightness  This is not exertional  Patient is only taking valsartan at this time        The following portions of the patient's history were reviewed and updated as appropriate: allergies, current medications, past family history, past medical history, past social history, past surgical history and problem list       Review of Systems   Constitutional: Negative  HENT: Negative  Eyes: Negative  Respiratory: Negative  Cardiovascular: Negative  Gastrointestinal: Negative  Endocrine: Negative  Genitourinary: Negative  Musculoskeletal: Negative  Skin: Negative  Allergic/Immunologic: Negative  Neurological: Positive for headaches  Hematological: Negative  Psychiatric/Behavioral: Negative  Objective:               /68 (BP Location: Left arm, Patient Position: Sitting, Cuff Size: Standard)   Pulse 61   Temp (!) 97 °F (36 1 °C) (Tympanic)   Ht 5' 6" (1 676 m)   Wt 83 9 kg (185 lb)   SpO2 99%   BMI 29 86 kg/m²          Physical Exam   Constitutional: She appears well-developed and well-nourished  No distress  HENT:   Head: Normocephalic     Right Ear: External ear normal    Left Ear: External ear normal    Mouth/Throat: Oropharynx is clear and moist  No oropharyngeal exudate  Eyes: Pupils are equal, round, and reactive to light  EOM are normal  Right eye exhibits no discharge  Left eye exhibits no discharge  No scleral icterus  Neck: Normal range of motion  Neck supple  No thyromegaly present  Cardiovascular: Normal rate, regular rhythm, normal heart sounds and intact distal pulses  Exam reveals no gallop and no friction rub  No murmur heard  Pulmonary/Chest: Effort normal and breath sounds normal  No respiratory distress  She has no wheezes  She has no rales  She exhibits no tenderness  Musculoskeletal: Normal range of motion  She exhibits no edema or tenderness  Lymphadenopathy:     She has no cervical adenopathy  Neurological: She is alert  No cranial nerve deficit  She exhibits normal muscle tone  Coordination normal    Skin: Skin is warm and dry  No rash noted  She is not diaphoretic  No erythema  No pallor  Psychiatric: She has a normal mood and affect  Her behavior is normal  Judgment and thought content normal    Nursing note and vitals reviewed

## 2021-03-31 DIAGNOSIS — Z23 ENCOUNTER FOR IMMUNIZATION: ICD-10-CM

## 2021-09-17 ENCOUNTER — APPOINTMENT (OUTPATIENT)
Dept: RADIOLOGY | Facility: MEDICAL CENTER | Age: 41
End: 2021-09-17
Payer: COMMERCIAL

## 2021-09-17 ENCOUNTER — OFFICE VISIT (OUTPATIENT)
Dept: FAMILY MEDICINE CLINIC | Facility: CLINIC | Age: 41
End: 2021-09-17
Payer: COMMERCIAL

## 2021-09-17 VITALS
DIASTOLIC BLOOD PRESSURE: 70 MMHG | SYSTOLIC BLOOD PRESSURE: 120 MMHG | BODY MASS INDEX: 31.51 KG/M2 | TEMPERATURE: 96.9 F | WEIGHT: 195.2 LBS

## 2021-09-17 DIAGNOSIS — S06.0X0D CONCUSSION WITHOUT LOSS OF CONSCIOUSNESS, SUBSEQUENT ENCOUNTER: Primary | ICD-10-CM

## 2021-09-17 DIAGNOSIS — M79.671 RIGHT FOOT PAIN: ICD-10-CM

## 2021-09-17 DIAGNOSIS — M79.671 PAIN IN RIGHT FOOT: ICD-10-CM

## 2021-09-17 DIAGNOSIS — Q25.1 COARCTATION OF AORTA: ICD-10-CM

## 2021-09-17 DIAGNOSIS — M79.673 PAIN OF FOOT, UNSPECIFIED LATERALITY: Primary | ICD-10-CM

## 2021-09-17 PROBLEM — S06.0X0A CONCUSSION WITHOUT LOSS OF CONSCIOUSNESS: Status: ACTIVE | Noted: 2021-09-17

## 2021-09-17 PROCEDURE — 3725F SCREEN DEPRESSION PERFORMED: CPT | Performed by: FAMILY MEDICINE

## 2021-09-17 PROCEDURE — 99213 OFFICE O/P EST LOW 20 MIN: CPT | Performed by: FAMILY MEDICINE

## 2021-09-17 PROCEDURE — 73630 X-RAY EXAM OF FOOT: CPT

## 2021-09-17 PROCEDURE — 1036F TOBACCO NON-USER: CPT | Performed by: FAMILY MEDICINE

## 2021-09-17 RX ORDER — METHOCARBAMOL 750 MG/1
750 TABLET, FILM COATED ORAL 4 TIMES DAILY PRN
COMMUNITY
Start: 2021-09-16 | End: 2021-11-17

## 2021-09-17 RX ORDER — MELOXICAM 15 MG/1
15 TABLET ORAL DAILY
Qty: 30 TABLET | Refills: 1 | Status: SHIPPED | OUTPATIENT
Start: 2021-09-17 | End: 2021-11-17
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
## 2021-11-17 ENCOUNTER — TELEPHONE (OUTPATIENT)
Dept: FAMILY MEDICINE CLINIC | Facility: CLINIC | Age: 41
End: 2021-11-17

## 2021-11-17 ENCOUNTER — CONSULT (OUTPATIENT)
Dept: FAMILY MEDICINE CLINIC | Facility: CLINIC | Age: 41
End: 2021-11-17
Payer: COMMERCIAL

## 2021-11-17 VITALS
HEIGHT: 66 IN | TEMPERATURE: 96.8 F | WEIGHT: 194 LBS | OXYGEN SATURATION: 99 % | SYSTOLIC BLOOD PRESSURE: 118 MMHG | BODY MASS INDEX: 31.18 KG/M2 | DIASTOLIC BLOOD PRESSURE: 68 MMHG | HEART RATE: 65 BPM | RESPIRATION RATE: 16 BRPM

## 2021-11-17 DIAGNOSIS — Z12.31 ENCOUNTER FOR SCREENING MAMMOGRAM FOR MALIGNANT NEOPLASM OF BREAST: Primary | ICD-10-CM

## 2021-11-17 PROCEDURE — 3008F BODY MASS INDEX DOCD: CPT | Performed by: FAMILY MEDICINE

## 2021-11-17 PROCEDURE — 1036F TOBACCO NON-USER: CPT | Performed by: FAMILY MEDICINE

## 2021-11-17 PROCEDURE — 99214 OFFICE O/P EST MOD 30 MIN: CPT | Performed by: FAMILY MEDICINE

## 2022-07-20 ENCOUNTER — OFFICE VISIT (OUTPATIENT)
Dept: FAMILY MEDICINE CLINIC | Facility: CLINIC | Age: 42
End: 2022-07-20
Payer: COMMERCIAL

## 2022-07-20 DIAGNOSIS — J06.9 ACUTE URI: Primary | ICD-10-CM

## 2022-07-20 PROCEDURE — 99213 OFFICE O/P EST LOW 20 MIN: CPT | Performed by: FAMILY MEDICINE

## 2022-07-20 PROCEDURE — 0241U HB NFCT DS VIR RESP RNA 4 TRGT: CPT | Performed by: FAMILY MEDICINE

## 2022-07-20 RX ORDER — HYDROCHLOROTHIAZIDE 25 MG/1
TABLET ORAL
COMMUNITY
Start: 2022-02-18

## 2022-07-20 NOTE — PROGRESS NOTES
Assessment/Plan:  Patient will continue with supportive care as well as rest and fluids  Patient tested for COVID as well as other viruses at this time  Diagnoses and all orders for this visit:    Acute URI    Other orders  -     hydrochlorothiazide (HYDRODIURIL) 25 mg tablet; Take half a tab (12 5 mg) daily for 5 days  If BP is still >130/80, increase to 25 mg daily  Subjective:        Patient ID: Mariya Zhou is a 36 y o  female  Patient is here with fever, cough congestion fatigue which began yesterday  No loss of smell or taste  No nausea vomiting or diarrhea associated with this  Minimal sputum production  Patient with rhinorrhea postnasal drip  COVID test negative        The following portions of the patient's history were reviewed and updated as appropriate: allergies, current medications, past family history, past medical history, past social history, past surgical history and problem list       Review of Systems   Constitutional: Positive for chills, fatigue and fever  HENT: Positive for congestion, postnasal drip and rhinorrhea  Eyes: Negative  Respiratory: Positive for cough and shortness of breath  Cardiovascular: Negative  Gastrointestinal: Negative  Endocrine: Negative  Genitourinary: Negative  Musculoskeletal: Negative  Skin: Negative  Allergic/Immunologic: Negative  Neurological: Negative  Hematological: Negative  Psychiatric/Behavioral: Negative  Objective:      BMI Counseling: There is no height or weight on file to calculate BMI  The BMI is above normal  Nutrition recommendations include decreasing portion sizes  Exercise recommendations include exercising 3-5 times per week  Rationale for BMI follow-up plan is due to patient being overweight or obese  There were no vitals taken for this visit  Physical Exam  Vitals and nursing note reviewed     Constitutional:       General: She is not in acute distress  Appearance: Normal appearance  She is not ill-appearing, toxic-appearing or diaphoretic  HENT:      Head: Normocephalic and atraumatic  Right Ear: Tympanic membrane, ear canal and external ear normal  There is no impacted cerumen  Left Ear: Tympanic membrane, ear canal and external ear normal  There is no impacted cerumen  Nose: Rhinorrhea present  No congestion  Mouth/Throat:      Mouth: Mucous membranes are moist       Pharynx: Oropharyngeal exudate present  No posterior oropharyngeal erythema  Eyes:      General: No scleral icterus  Right eye: No discharge  Left eye: No discharge  Extraocular Movements: Extraocular movements intact  Conjunctiva/sclera: Conjunctivae normal       Pupils: Pupils are equal, round, and reactive to light  Neck:      Vascular: No carotid bruit  Cardiovascular:      Rate and Rhythm: Normal rate and regular rhythm  Pulses: Normal pulses  Heart sounds: Normal heart sounds  No murmur heard  No friction rub  No gallop  Pulmonary:      Effort: Pulmonary effort is normal  No respiratory distress  Breath sounds: Normal breath sounds  No stridor  No wheezing, rhonchi or rales  Chest:      Chest wall: No tenderness  Musculoskeletal:         General: No swelling, tenderness, deformity or signs of injury  Normal range of motion  Cervical back: Normal range of motion and neck supple  No rigidity  No muscular tenderness  Right lower leg: No edema  Left lower leg: No edema  Lymphadenopathy:      Cervical: No cervical adenopathy  Skin:     General: Skin is warm and dry  Capillary Refill: Capillary refill takes less than 2 seconds  Coloration: Skin is not jaundiced  Findings: No bruising, erythema, lesion or rash  Neurological:      Mental Status: She is alert and oriented to person, place, and time  Cranial Nerves: No cranial nerve deficit        Sensory: No sensory deficit  Motor: No weakness  Coordination: Coordination normal       Gait: Gait normal    Psychiatric:         Mood and Affect: Mood normal          Behavior: Behavior normal          Thought Content:  Thought content normal          Judgment: Judgment normal

## 2022-07-21 LAB
FLUAV RNA RESP QL NAA+PROBE: NEGATIVE
FLUBV RNA RESP QL NAA+PROBE: NEGATIVE
RSV RNA RESP QL NAA+PROBE: NEGATIVE
SARS-COV-2 RNA RESP QL NAA+PROBE: POSITIVE

## 2022-10-11 PROBLEM — J06.9 ACUTE URI: Status: RESOLVED | Noted: 2022-07-20 | Resolved: 2022-10-11

## 2023-02-14 ENCOUNTER — TELEPHONE (OUTPATIENT)
Dept: FAMILY MEDICINE CLINIC | Facility: CLINIC | Age: 43
End: 2023-02-14

## 2023-02-14 DIAGNOSIS — R92.8 ABNORMAL MAMMOGRAM: Primary | ICD-10-CM

## 2023-02-14 NOTE — TELEPHONE ENCOUNTER
Patient had abnormal mammogram and is scheduled for additional testing  Right breast diagnostic mammogram with CAD and right breast ultrasound limited  Both scripts are pended please sign thank you      [id number] [facility] [phone]

## 2023-02-17 ENCOUNTER — OFFICE VISIT (OUTPATIENT)
Dept: FAMILY MEDICINE CLINIC | Facility: CLINIC | Age: 43
End: 2023-02-17

## 2023-02-17 VITALS
SYSTOLIC BLOOD PRESSURE: 130 MMHG | OXYGEN SATURATION: 96 % | WEIGHT: 195.4 LBS | TEMPERATURE: 97.3 F | HEART RATE: 78 BPM | BODY MASS INDEX: 31.4 KG/M2 | HEIGHT: 66 IN | DIASTOLIC BLOOD PRESSURE: 62 MMHG

## 2023-02-17 DIAGNOSIS — Z00.00 WELL ADULT EXAM: Primary | ICD-10-CM

## 2023-02-17 DIAGNOSIS — Z23 ENCOUNTER FOR IMMUNIZATION: ICD-10-CM

## 2023-02-17 NOTE — PROGRESS NOTES
Assessment/Plan: Vaccines reviewed  Patient will flu shot at this time  Patient will follow-up with gynecology appropriately  Patient status post mammogram and ultrasound of breast   No early family history of colorectal cancer  Patient will be going for laboratory studies with cardiology  Will await results  Other guidance given at this time  Patient will follow-up yearly or as needed     Diagnoses and all orders for this visit:    Well adult exam            Subjective:        Patient ID: Lesli Bloch is a 36 y o  female  Pt  Is here for wellness exam   Patient up-to-date with gynecology as well as mammograms  No early family history of colorectal cancer  Labs and vaccines reviewed  The following portions of the patient's history were reviewed and updated as appropriate: allergies, current medications, past family history, past medical history, past social history, past surgical history and problem list       Review of Systems   Constitutional: Negative  HENT: Negative  Eyes: Negative  Respiratory: Negative  Cardiovascular: Negative  Gastrointestinal: Negative  Endocrine: Negative  Genitourinary: Negative  Musculoskeletal: Negative  Skin: Negative  Allergic/Immunologic: Negative  Neurological: Negative  Hematological: Negative  Psychiatric/Behavioral: Negative  Objective:      BMI Counseling: Body mass index is 31 54 kg/m²  The BMI is above normal  Nutrition recommendations include decreasing portion sizes  Exercise recommendations include moderate physical activity 150 minutes/week  Rationale for BMI follow-up plan is due to patient being overweight or obese  Depression Screening and Follow-up Plan: Patient was screened for depression during today's encounter   They screened negative with a PHQ-2 score of 0             /62 (BP Location: Right arm, Patient Position: Sitting, Cuff Size: Standard)   Pulse 78   Temp (!) 97 3 °F (36 3 °C) (Temporal)   Ht 5' 6" (1 676 m)   Wt 88 6 kg (195 lb 6 4 oz)   SpO2 96%   BMI 31 54 kg/m²          Physical Exam  Vitals and nursing note reviewed  Constitutional:       General: She is not in acute distress  Appearance: Normal appearance  She is not ill-appearing, toxic-appearing or diaphoretic  HENT:      Head: Normocephalic and atraumatic  Right Ear: Tympanic membrane, ear canal and external ear normal  There is no impacted cerumen  Left Ear: Tympanic membrane, ear canal and external ear normal  There is no impacted cerumen  Nose: Nose normal  No congestion or rhinorrhea  Mouth/Throat:      Mouth: Mucous membranes are moist       Pharynx: No oropharyngeal exudate or posterior oropharyngeal erythema  Eyes:      General: No scleral icterus  Right eye: No discharge  Left eye: No discharge  Extraocular Movements: Extraocular movements intact  Conjunctiva/sclera: Conjunctivae normal       Pupils: Pupils are equal, round, and reactive to light  Neck:      Vascular: No carotid bruit  Cardiovascular:      Rate and Rhythm: Normal rate and regular rhythm  Pulses: Normal pulses  Heart sounds: Normal heart sounds  No murmur heard  No friction rub  No gallop  Pulmonary:      Effort: Pulmonary effort is normal  No respiratory distress  Breath sounds: Normal breath sounds  No stridor  No wheezing, rhonchi or rales  Chest:      Chest wall: No tenderness  Musculoskeletal:         General: No swelling, tenderness, deformity or signs of injury  Normal range of motion  Cervical back: Normal range of motion and neck supple  No rigidity  No muscular tenderness  Right lower leg: No edema  Left lower leg: No edema  Lymphadenopathy:      Cervical: No cervical adenopathy  Skin:     General: Skin is warm and dry  Capillary Refill: Capillary refill takes less than 2 seconds  Coloration: Skin is not jaundiced  Findings: No bruising, erythema, lesion or rash  Neurological:      Mental Status: She is alert and oriented to person, place, and time  Mental status is at baseline  Cranial Nerves: No cranial nerve deficit  Sensory: No sensory deficit  Motor: No weakness  Coordination: Coordination normal       Gait: Gait normal    Psychiatric:         Mood and Affect: Mood normal          Behavior: Behavior normal          Thought Content:  Thought content normal          Judgment: Judgment normal

## 2023-04-18 PROBLEM — Z00.00 WELL ADULT EXAM: Status: RESOLVED | Noted: 2023-02-17 | Resolved: 2023-04-18

## 2023-07-25 ENCOUNTER — TELEPHONE (OUTPATIENT)
Dept: ADMINISTRATIVE | Facility: OTHER | Age: 43
End: 2023-07-25

## 2023-07-25 ENCOUNTER — OFFICE VISIT (OUTPATIENT)
Dept: FAMILY MEDICINE CLINIC | Facility: CLINIC | Age: 43
End: 2023-07-25
Payer: COMMERCIAL

## 2023-07-25 ENCOUNTER — TELEPHONE (OUTPATIENT)
Dept: FAMILY MEDICINE CLINIC | Facility: CLINIC | Age: 43
End: 2023-07-25

## 2023-07-25 VITALS
WEIGHT: 191.4 LBS | DIASTOLIC BLOOD PRESSURE: 60 MMHG | TEMPERATURE: 98 F | RESPIRATION RATE: 21 BRPM | HEIGHT: 66 IN | BODY MASS INDEX: 30.76 KG/M2 | OXYGEN SATURATION: 98 % | SYSTOLIC BLOOD PRESSURE: 160 MMHG | HEART RATE: 80 BPM

## 2023-07-25 DIAGNOSIS — Q25.1 COARCTATION OF AORTA: ICD-10-CM

## 2023-07-25 DIAGNOSIS — R30.0 DYSURIA: Primary | ICD-10-CM

## 2023-07-25 LAB
BACTERIA UR QL AUTO: ABNORMAL /HPF
BILIRUB UR QL STRIP: NEGATIVE
CLARITY UR: ABNORMAL
COLOR UR: YELLOW
GLUCOSE UR STRIP-MCNC: NEGATIVE MG/DL
HGB UR QL STRIP.AUTO: ABNORMAL
KETONES UR STRIP-MCNC: NEGATIVE MG/DL
LEUKOCYTE ESTERASE UR QL STRIP: ABNORMAL
MUCOUS THREADS UR QL AUTO: ABNORMAL
NITRITE UR QL STRIP: NEGATIVE
NON-SQ EPI CELLS URNS QL MICRO: ABNORMAL /HPF
PH UR STRIP.AUTO: 6 [PH]
PROT UR STRIP-MCNC: ABNORMAL MG/DL
RBC #/AREA URNS AUTO: ABNORMAL /HPF
SL AMB  POCT GLUCOSE, UA: NEGATIVE
SL AMB LEUKOCYTE ESTERASE,UA: ABNORMAL
SL AMB POCT BILIRUBIN,UA: NEGATIVE
SL AMB POCT BLOOD,UA: ABNORMAL
SL AMB POCT CLARITY,UA: ABNORMAL
SL AMB POCT COLOR,UA: YELLOW
SL AMB POCT KETONES,UA: NEGATIVE
SL AMB POCT NITRITE,UA: NEGATIVE
SL AMB POCT PH,UA: 5
SL AMB POCT SPECIFIC GRAVITY,UA: 1.02
SL AMB POCT URINE PROTEIN: 15
SL AMB POCT UROBILINOGEN: NEGATIVE
SP GR UR STRIP.AUTO: 1.02 (ref 1–1.03)
UROBILINOGEN UR STRIP-ACNC: <2 MG/DL
WBC #/AREA URNS AUTO: ABNORMAL /HPF

## 2023-07-25 PROCEDURE — 81001 URINALYSIS AUTO W/SCOPE: CPT | Performed by: FAMILY MEDICINE

## 2023-07-25 PROCEDURE — 99213 OFFICE O/P EST LOW 20 MIN: CPT | Performed by: FAMILY MEDICINE

## 2023-07-25 PROCEDURE — 81002 URINALYSIS NONAUTO W/O SCOPE: CPT | Performed by: FAMILY MEDICINE

## 2023-07-25 PROCEDURE — 87086 URINE CULTURE/COLONY COUNT: CPT | Performed by: FAMILY MEDICINE

## 2023-07-25 RX ORDER — CIPROFLOXACIN 500 MG/1
500 TABLET, FILM COATED ORAL EVERY 12 HOURS SCHEDULED
Qty: 14 TABLET | Refills: 0 | Status: SHIPPED | OUTPATIENT
Start: 2023-07-25 | End: 2023-08-01

## 2023-07-25 NOTE — PROGRESS NOTES
Assessment/Plan: Urinalysis and culture will be done. Patient will start Cipro as directed. Patient will stay well-hydrated. Diagnoses and all orders for this visit:    Dysuria  -     POCT urine dip  -     ciprofloxacin (CIPRO) 500 mg tablet; Take 1 tablet (500 mg total) by mouth every 12 (twelve) hours for 7 days    Coarctation of aorta            Subjective:        Patient ID: Megan Chew is a 39 y.o. female. Patient is here for dysuria over the past few days. Patient does have some thoracolumbar pain also. Patient is feeling hot and cold intermittently. No treatment use. No pelvic pain noted. No nausea or vomiting. The following portions of the patient's history were reviewed and updated as appropriate: allergies, current medications, past family history, past medical history, past social history, past surgical history and problem list.      Review of Systems   Constitutional: Negative. HENT: Negative. Eyes: Negative. Respiratory: Negative. Cardiovascular: Negative. Gastrointestinal: Negative. Endocrine: Negative. Genitourinary: Positive for dysuria. Musculoskeletal: Positive for back pain. Skin: Negative. Allergic/Immunologic: Negative. Neurological: Negative. Hematological: Negative. Psychiatric/Behavioral: Negative. Objective:               /60 (BP Location: Right arm, Patient Position: Sitting, Cuff Size: Standard)   Pulse 80   Temp 98 °F (36.7 °C) (Temporal)   Resp 21   Ht 5' 6" (1.676 m)   Wt 86.8 kg (191 lb 6.4 oz)   SpO2 98%   BMI 30.89 kg/m²          Physical Exam  Vitals and nursing note reviewed. Constitutional:       General: She is not in acute distress. Appearance: Normal appearance. She is not ill-appearing, toxic-appearing or diaphoretic. Cardiovascular:      Rate and Rhythm: Normal rate and regular rhythm. Pulses: Normal pulses. Heart sounds: Murmur heard.    Pulmonary:      Effort: Pulmonary effort is normal.      Breath sounds: Normal breath sounds. Abdominal:      Tenderness: There is no right CVA tenderness or left CVA tenderness. Neurological:      Mental Status: She is alert.

## 2023-07-25 NOTE — TELEPHONE ENCOUNTER
----- Message from [place], [place] sent at 7/25/2023  9:38 AM EDT -----  Regarding: Pap results  Hello,    Can you please obtain patient's PAP results done 5/12/2023 through [facility] ([facility] ordered by Dr Nicole Villalba. The results of the pap are in [address] [facility]. Thank you.

## 2023-07-25 NOTE — TELEPHONE ENCOUNTER
Patient called and states she has been experiencing burning when urinating for a couple of days. I did schedule her for an appointment first thing tomorrow morning and advised her if her symptoms get worse to go to the nearest urgent care. Patient expressed understanding and agrees but would like to know what she can do in between time. Please advise.         Thank you

## 2023-07-25 NOTE — TELEPHONE ENCOUNTER
Upon review of the In Basket request we were able to locate, review, and update the patient chart as requested for HIV and Pap Smear (HPV) aka Cervical Cancer Screening. Any additional questions or concerns should be emailed to the Practice Liaisons via the appropriate education email address, please do not reply via In Basket.     Thank you  B[employer]

## 2023-07-27 LAB — BACTERIA UR CULT: NORMAL

## 2023-07-29 ENCOUNTER — HOSPITAL ENCOUNTER (EMERGENCY)
Facility: HOSPITAL | Age: 43
Discharge: HOME | End: 2023-07-29
Attending: EMERGENCY MEDICINE
Payer: COMMERCIAL

## 2023-07-29 ENCOUNTER — APPOINTMENT (EMERGENCY)
Dept: CT IMAGING | Facility: HOSPITAL | Age: 43
End: 2023-07-29
Payer: COMMERCIAL

## 2023-07-29 VITALS
DIASTOLIC BLOOD PRESSURE: 58 MMHG | SYSTOLIC BLOOD PRESSURE: 127 MMHG | HEART RATE: 68 BPM | RESPIRATION RATE: 18 BRPM | BODY MASS INDEX: 30.74 KG/M2 | TEMPERATURE: 98.7 F | WEIGHT: 190.48 LBS | OXYGEN SATURATION: 100 %

## 2023-07-29 DIAGNOSIS — N12 PYELONEPHRITIS: Primary | ICD-10-CM

## 2023-07-29 LAB
ALBUMIN SERPL BCP-MCNC: 4.3 G/DL (ref 3.5–5)
ALP SERPL-CCNC: 129 U/L (ref 34–104)
ALT SERPL W P-5'-P-CCNC: 54 U/L (ref 7–52)
ANION GAP SERPL CALCULATED.3IONS-SCNC: 7 MMOL/L
AST SERPL W P-5'-P-CCNC: 48 U/L (ref 13–39)
BACTERIA UR QL AUTO: NORMAL /HPF
BASOPHILS # BLD AUTO: 0.03 THOUSANDS/ÂΜL (ref 0–0.1)
BASOPHILS NFR BLD AUTO: 0 % (ref 0–1)
BILIRUB SERPL-MCNC: 0.56 MG/DL (ref 0.2–1)
BILIRUB UR QL STRIP: NEGATIVE
BUN SERPL-MCNC: 10 MG/DL (ref 5–25)
CALCIUM SERPL-MCNC: 9.4 MG/DL (ref 8.4–10.2)
CHLORIDE SERPL-SCNC: 96 MMOL/L (ref 96–108)
CLARITY UR: CLEAR
CO2 SERPL-SCNC: 32 MMOL/L (ref 21–32)
COLOR UR: YELLOW
CREAT SERPL-MCNC: 0.73 MG/DL (ref 0.6–1.3)
EOSINOPHIL # BLD AUTO: 0.31 THOUSAND/ÂΜL (ref 0–0.61)
EOSINOPHIL NFR BLD AUTO: 3 % (ref 0–6)
ERYTHROCYTE [DISTWIDTH] IN BLOOD BY AUTOMATED COUNT: 12.7 % (ref 11.6–15.1)
EXT PREGNANCY TEST URINE: NEGATIVE
EXT. CONTROL: NORMAL
GFR SERPL CREATININE-BSD FRML MDRD: 100 ML/MIN/1.73SQ M
GLUCOSE SERPL-MCNC: 101 MG/DL (ref 65–140)
GLUCOSE UR STRIP-MCNC: NEGATIVE MG/DL
HCT VFR BLD AUTO: 39.8 % (ref 34.8–46.1)
HGB BLD-MCNC: 13.1 G/DL (ref 11.5–15.4)
HGB UR QL STRIP.AUTO: ABNORMAL
IMM GRANULOCYTES # BLD AUTO: 0.03 THOUSAND/UL (ref 0–0.2)
IMM GRANULOCYTES NFR BLD AUTO: 0 % (ref 0–2)
KETONES UR STRIP-MCNC: NEGATIVE MG/DL
LEUKOCYTE ESTERASE UR QL STRIP: NEGATIVE
LIPASE SERPL-CCNC: 8 U/L (ref 11–82)
LYMPHOCYTES # BLD AUTO: 1.73 THOUSANDS/ÂΜL (ref 0.6–4.47)
LYMPHOCYTES NFR BLD AUTO: 19 % (ref 14–44)
MCH RBC QN AUTO: 30.8 PG (ref 26.8–34.3)
MCHC RBC AUTO-ENTMCNC: 32.9 G/DL (ref 31.4–37.4)
MCV RBC AUTO: 94 FL (ref 82–98)
MONOCYTES # BLD AUTO: 0.82 THOUSAND/ÂΜL (ref 0.17–1.22)
MONOCYTES NFR BLD AUTO: 9 % (ref 4–12)
NEUTROPHILS # BLD AUTO: 6.11 THOUSANDS/ÂΜL (ref 1.85–7.62)
NEUTS SEG NFR BLD AUTO: 69 % (ref 43–75)
NITRITE UR QL STRIP: NEGATIVE
NON-SQ EPI CELLS URNS QL MICRO: NORMAL /HPF
NRBC BLD AUTO-RTO: 0 /100 WBCS
PH UR STRIP.AUTO: 5.5 [PH] (ref 4.5–8)
PLATELET # BLD AUTO: 258 THOUSANDS/UL (ref 149–390)
PMV BLD AUTO: 9.4 FL (ref 8.9–12.7)
POTASSIUM SERPL-SCNC: 3.3 MMOL/L (ref 3.5–5.3)
PROT SERPL-MCNC: 8.1 G/DL (ref 6.4–8.4)
PROT UR STRIP-MCNC: NEGATIVE MG/DL
RBC # BLD AUTO: 4.25 MILLION/UL (ref 3.81–5.12)
RBC #/AREA URNS AUTO: NORMAL /HPF
SODIUM SERPL-SCNC: 135 MMOL/L (ref 135–147)
SP GR UR STRIP.AUTO: 1.01 (ref 1–1.03)
UROBILINOGEN UR QL STRIP.AUTO: 0.2 E.U./DL
WBC # BLD AUTO: 9.03 THOUSAND/UL (ref 4.31–10.16)
WBC #/AREA URNS AUTO: NORMAL /HPF

## 2023-07-29 PROCEDURE — 99285 EMERGENCY DEPT VISIT HI MDM: CPT

## 2023-07-29 PROCEDURE — 87591 N.GONORRHOEAE DNA AMP PROB: CPT | Performed by: EMERGENCY MEDICINE

## 2023-07-29 PROCEDURE — 96361 HYDRATE IV INFUSION ADD-ON: CPT

## 2023-07-29 PROCEDURE — 36415 COLL VENOUS BLD VENIPUNCTURE: CPT | Performed by: EMERGENCY MEDICINE

## 2023-07-29 PROCEDURE — 74176 CT ABD & PELVIS W/O CONTRAST: CPT

## 2023-07-29 PROCEDURE — 87491 CHLMYD TRACH DNA AMP PROBE: CPT | Performed by: EMERGENCY MEDICINE

## 2023-07-29 PROCEDURE — 99284 EMERGENCY DEPT VISIT MOD MDM: CPT | Performed by: EMERGENCY MEDICINE

## 2023-07-29 PROCEDURE — 81025 URINE PREGNANCY TEST: CPT | Performed by: EMERGENCY MEDICINE

## 2023-07-29 PROCEDURE — 85025 COMPLETE CBC W/AUTO DIFF WBC: CPT | Performed by: EMERGENCY MEDICINE

## 2023-07-29 PROCEDURE — 80053 COMPREHEN METABOLIC PANEL: CPT | Performed by: EMERGENCY MEDICINE

## 2023-07-29 PROCEDURE — 83690 ASSAY OF LIPASE: CPT | Performed by: EMERGENCY MEDICINE

## 2023-07-29 PROCEDURE — G1004 CDSM NDSC: HCPCS

## 2023-07-29 PROCEDURE — 96374 THER/PROPH/DIAG INJ IV PUSH: CPT

## 2023-07-29 PROCEDURE — 81001 URINALYSIS AUTO W/SCOPE: CPT

## 2023-07-29 RX ORDER — TRAMADOL HYDROCHLORIDE 50 MG/1
50 TABLET ORAL ONCE
Status: COMPLETED | OUTPATIENT
Start: 2023-07-29 | End: 2023-07-29

## 2023-07-29 RX ORDER — TRAMADOL HYDROCHLORIDE 50 MG/1
50 TABLET ORAL EVERY 6 HOURS PRN
Qty: 15 TABLET | Refills: 0 | Status: SHIPPED | OUTPATIENT
Start: 2023-07-29 | End: 2023-08-08

## 2023-07-29 RX ORDER — IBUPROFEN 600 MG/1
600 TABLET ORAL EVERY 6 HOURS PRN
Qty: 20 TABLET | Refills: 0 | Status: SHIPPED | OUTPATIENT
Start: 2023-07-29

## 2023-07-29 RX ORDER — KETOROLAC TROMETHAMINE 30 MG/ML
30 INJECTION, SOLUTION INTRAMUSCULAR; INTRAVENOUS ONCE
Status: COMPLETED | OUTPATIENT
Start: 2023-07-29 | End: 2023-07-29

## 2023-07-29 RX ORDER — CEPHALEXIN 500 MG/1
500 CAPSULE ORAL EVERY 6 HOURS SCHEDULED
Qty: 28 CAPSULE | Refills: 0 | Status: SHIPPED | OUTPATIENT
Start: 2023-07-29 | End: 2023-08-05

## 2023-07-29 RX ADMIN — SODIUM CHLORIDE 1000 ML: 0.9 INJECTION, SOLUTION INTRAVENOUS at 12:47

## 2023-07-29 RX ADMIN — TRAMADOL HYDROCHLORIDE 50 MG: 50 TABLET, COATED ORAL at 14:18

## 2023-07-29 RX ADMIN — KETOROLAC TROMETHAMINE 30 MG: 30 INJECTION, SOLUTION INTRAMUSCULAR; INTRAVENOUS at 12:50

## 2023-07-29 NOTE — ED PROVIDER NOTES
History  Chief Complaint   Patient presents with   • Blood in Urine     Pt reports has a uti on abx but now right flank pain and intermittent blood in urine      38 y/o female with discomfort when urinating and blood in the urine since 4 days ago. She was started on cipro by her family dr. Bravo, initially felt a little better but now feels worse since 2 days ago when she developed R flank pain. No h/o kidney stones. Urine culture from 7/25 shows mixed contaminents. She is sexually active,uses condoms and is not worried that it is an STD. Taking motrin without much relief. Prior to Admission Medications   Prescriptions Last Dose Informant Patient Reported? Taking? MULTIPLE VITAMINS-MINERALS ER PO  Self Yes No   Sig: Take 1 tablet by mouth daily   albuterol (Ventolin HFA) 90 mcg/act inhaler  Self No No   Sig: Inhale 2 puffs every 6 (six) hours as needed for wheezing   ciprofloxacin (CIPRO) 500 mg tablet   No No   Sig: Take 1 tablet (500 mg total) by mouth every 12 (twelve) hours for 7 days   hydrochlorothiazide (HYDRODIURIL) 25 mg tablet   Yes No   Sig: Take half a tab (12.5 mg) daily for 5 days. If BP is still >130/80, increase to 25 mg daily.    valsartan (DIOVAN) 160 mg tablet  Self Yes No   Sig: Take 80 mg by mouth daily      Facility-Administered Medications: None       Past Medical History:   Diagnosis Date   • Allergic    • Asthma    • Bicuspid aortic valve    • Cardiomyopathy ([address])    • Coronary artery disease    • Depression    • Heart murmur    • Hyperlipemia    • Hypertension    • Unequal leg length (acquired)        Past Surgical History:   Procedure Laterality Date   • AORTIC VALVE REPAIR      Aortic Coarctation repair   • KNEE SURGERY         Family History   Problem Relation Age of Onset   • No Known Problems Mother    • Other Brother         cardiac death unsure what condition    • Other Son         hyperplasic aortic heart/bicuspid aortic valve      I have reviewed and agree with the history as documented. E-Cigarette/Vaping   • E-Cigarette Use Never User      E-Cigarette/Vaping Substances   • Nicotine No    • THC No    • CBD No    • Flavoring No    • Other No    • Unknown No      Social History     Tobacco Use   • Smoking status: Never     Passive exposure: Past   • Smokeless tobacco: Never   Vaping Use   • Vaping Use: Never used   Substance Use Topics   • Alcohol use: Yes     Comment: occasionally   • Drug use: Never       Review of Systems   Constitutional: Negative for appetite change, fatigue and fever. HENT: Negative for rhinorrhea and sore throat. Eyes: Negative for pain. Respiratory: Negative for cough, shortness of breath and wheezing. Cardiovascular: Negative for chest pain and leg swelling. Gastrointestinal: Negative for abdominal pain, diarrhea, nausea and vomiting. Genitourinary: Positive for dysuria, flank pain and hematuria. Musculoskeletal: Negative for back pain and neck pain. Skin: Negative for rash. Neurological: Negative for syncope and headaches. Psychiatric/Behavioral:        Mood normal       Physical Exam  Physical Exam  Vitals and nursing note reviewed. Constitutional:       Appearance: She is well-developed. HENT:      Head: Normocephalic and atraumatic. Right Ear: External ear normal.      Left Ear: External ear normal.   Eyes:      General: No scleral icterus. Extraocular Movements: Extraocular movements intact. Cardiovascular:      Rate and Rhythm: Normal rate and regular rhythm. Pulmonary:      Effort: Pulmonary effort is normal. No respiratory distress. Breath sounds: Normal breath sounds. Abdominal:      Palpations: Abdomen is soft. Tenderness: There is no abdominal tenderness. Musculoskeletal:         General: No deformity or signs of injury. Normal range of motion. Cervical back: Normal range of motion and neck supple. Comments: R CVA tenderness   Skin:     General: Skin is warm and dry. Coloration: Skin is not jaundiced or pale. Neurological:      General: No focal deficit present. Mental Status: She is alert and oriented to person, place, and time.    Psychiatric:         Mood and Affect: Mood normal.         Behavior: Behavior normal.         Vital Signs  ED Triage Vitals   Temperature Pulse Respirations Blood Pressure SpO2   07/29/23 1212 07/29/23 1211 07/29/23 1211 07/29/23 1211 07/29/23 1211   98.7 °F (37.1 °C) 77 16 153/70 100 %      Temp Source Heart Rate Source Patient Position - Orthostatic VS BP Location FiO2 (%)   07/29/23 1212 07/29/23 1211 07/29/23 1211 07/29/23 1211 --   Oral Monitor Sitting Right arm       Pain Score       07/29/23 1250       6           Vitals:    07/29/23 1211 07/29/23 1337 07/29/23 1431   BP: 153/70 140/63 127/58   Pulse: 77 65 68   Patient Position - Orthostatic VS: Sitting Lying Lying         Visual Acuity      ED Medications  Medications   sodium chloride 0.9 % bolus 1,000 mL (0 mL Intravenous Stopped 7/29/23 1337)   ketorolac (TORADOL) injection 30 mg (30 mg Intravenous Given 7/29/23 1250)   traMADol (ULTRAM) tablet 50 mg (50 mg Oral Given 7/29/23 1418)       Diagnostic Studies  Results Reviewed     Procedure Component Value Units Date/Time    Comprehensive metabolic panel [[id number]]  (Abnormal) Collected: 07/29/23 1246    Lab Status: Final result Specimen: Blood from Arm, Right Updated: 07/29/23 1311     Sodium 135 mmol/L      Potassium 3.3 mmol/L      Chloride 96 mmol/L      CO2 32 mmol/L      ANION GAP 7 mmol/L      BUN 10 mg/dL      Creatinine 0.73 mg/dL      Glucose 101 mg/dL      Calcium 9.4 mg/dL      AST 48 U/L      ALT 54 U/L      Alkaline Phosphatase 129 U/L      Total Protein 8.1 g/dL      Albumin 4.3 g/dL      Total Bilirubin 0.56 mg/dL      eGFR 100 ml/min/1.73sq m     Narrative:      [place] guidelines for Chronic Kidney Disease (CKD):   •  Stage 1 with normal or high GFR (GFR > 90 mL/min/1.73 square meters)  • Stage 2 Mild CKD (GFR = 60-89 mL/min/1.73 square meters)  •  Stage 3A Moderate CKD (GFR = 45-59 mL/min/1.73 square meters)  •  Stage 3B Moderate CKD (GFR = 30-44 mL/min/1.73 square meters)  •  Stage 4 Severe CKD (GFR = 15-29 mL/min/1.73 square meters)  •  Stage 5 End Stage CKD (GFR <15 mL/min/1.73 square meters)  Note: GFR calculation is accurate only with a steady state creatinine    Lipase [[id number]]  (Abnormal) Collected: 07/29/23 1246    Lab Status: Final result Specimen: Blood from Arm, Right Updated: 07/29/23 1311     Lipase 8 u/L     CBC and differential [[id number]] Collected: 07/29/23 1246    Lab Status: Final result Specimen: Blood from Arm, Right Updated: 07/29/23 1253     WBC 9.03 Thousand/uL      RBC 4.25 Million/uL      Hemoglobin 13.1 g/dL      Hematocrit 39.8 %      MCV 94 fL      MCH 30.8 pg      MCHC 32.9 g/dL      RDW 12.7 %      MPV 9.4 fL      Platelets 448 Thousands/uL      nRBC 0 /100 WBCs      Neutrophils Relative 69 %      Immat GRANS % 0 %      Lymphocytes Relative 19 %      Monocytes Relative 9 %      Eosinophils Relative 3 %      Basophils Relative 0 %      Neutrophils Absolute 6.11 Thousands/µL      Immature Grans Absolute 0.03 Thousand/uL      Lymphocytes Absolute 1.73 Thousands/µL      Monocytes Absolute 0.82 Thousand/µL      Eosinophils Absolute 0.31 Thousand/µL      Basophils Absolute 0.03 Thousands/µL     Urine Microscopic [[id number]]  (Normal) Collected: 07/29/23 1229    Lab Status: Final result Specimen: Urine, Clean Catch Updated: 07/29/23 1253     RBC, UA 1-2 /hpf      WBC, UA 1-2 /hpf      Epithelial Cells Occasional /hpf      Bacteria, UA None Seen /hpf     Chlamydia/GC amplified DNA by PCR [[id number]] Collected: 07/29/23 1234    Lab Status:  In process Specimen: Urine, Other Updated: 07/29/23 1240    POCT pregnancy, urine [[id number]]  (Normal) Resulted: 07/29/23 1233    Lab Status: Final result Updated: 07/29/23 1233     EXT Preg Test, Ur Negative     Control Valid    Urine Macroscopic, POC [[id number]]  (Abnormal) Collected: 07/29/23 1229    Lab Status: Final result Specimen: Urine Updated: 07/29/23 1230     Color, UA Yellow     Clarity, UA Clear     pH, UA 5.5     Leukocytes, UA Negative     Nitrite, UA Negative     Protein, UA Negative mg/dl      Glucose, UA Negative mg/dl      Ketones, UA Negative mg/dl      Urobilinogen, UA 0.2 E.U./dl      Bilirubin, UA Negative     Occult Blood, UA Trace     Specific Gravity, UA 1.015    Narrative:      [facility] RESULT                 CT renal stone study abdomen pelvis without contrast   Final Result by Jaden Galdamez MD (07/29 1326)      No kidney stone is seen. No hydronephrosis. Workstation performed: [id number]                    Procedures  Procedures         ED Course                               SBIRT 20yo+    Flowsheet Row Most Recent Value   Initial Alcohol Screen: US AUDIT-C     1. How often do you have a drink containing alcohol? 2 Filed at: 07/29/2023 1247   2. How many drinks containing alcohol do you have on a typical day you are drinking? 0 Filed at: 07/29/2023 1247   3b. FEMALE Any Age, or MALE 65+: How often do you have 4 or more drinks on one occassion? 0 Filed at: 07/29/2023 1247   Audit-C Score 2 Filed at: 07/29/2023 1247   MILAGRO: How many times in the past year have you. .. Used an illegal drug or used a prescription medication for non-medical reasons? Never Filed at: 07/29/2023 [address] over [address] and labs with the pt. - clinically it sounds like a worsening uti/pyelonephritis. Urine dip looks okay though. Will change her antibiotic and have her follow up with her drMorales As an outpt. No urine cultures results from 7/25 - mixed contaminants. Amount and/or Complexity of Data Reviewed  Labs: ordered. Radiology: ordered. Risk  Prescription drug management.           Disposition  Final diagnoses:   Pyelonephritis     Time reflects when diagnosis was documented in both MDM as applicable and the Disposition within this note     Time User Action Codes Description Comment    7/29/2023  2:14 PM Jigna Cleary Add [N12] Pyelonephritis       ED Disposition     ED Disposition   Discharge    Condition   Stable    Date/Time   Sat Jul 29, 2023  2:14 PM    Comment   Yasir Crenshaw discharge to home/self care. Follow-up Information     Follow up With Specialties Details Why [address], DO Family Medicine   [address]  [phone]            Discharge Medication List as of 7/29/2023  2:19 PM      START taking these medications    Details   cephalexin (KEFLEX) 500 mg capsule Take 1 capsule (500 mg total) by mouth every 6 (six) hours for 7 days, Starting Sat 7/29/2023, Until Sat 8/5/2023, Normal      ibuprofen (MOTRIN) 600 mg tablet Take 1 tablet (600 mg total) by mouth every 6 (six) hours as needed for moderate pain, Starting Sat 7/29/2023, Normal      traMADol (Ultram) 50 mg tablet Take 1 tablet (50 mg total) by mouth every 6 (six) hours as needed for moderate pain for up to 10 days, Starting Sat 7/29/2023, Until Tue 8/8/2023 at 2359, Normal         CONTINUE these medications which have NOT CHANGED    Details   albuterol (Ventolin HFA) 90 mcg/act inhaler Inhale 2 puffs every 6 (six) hours as needed for wheezing, Starting Wed 2/19/2020, Normal      ciprofloxacin (CIPRO) 500 mg tablet Take 1 tablet (500 mg total) by mouth every 12 (twelve) hours for 7 days, Starting Tue 7/25/2023, Until Tue 8/1/2023, Normal      hydrochlorothiazide (HYDRODIURIL) 25 mg tablet Take half a tab (12.5 mg) daily for 5 days. If BP is still >130/80, increase to 25 mg daily. , Historical Med      MULTIPLE VITAMINS-MINERALS ER PO Take 1 tablet by mouth daily, Historical Med      valsartan (DIOVAN) 160 mg tablet Take 80 mg by mouth daily, Starting Mon 2/17/2020, Historical Med             No discharge procedures on file.    PDMP Review     None          ED Provider  Electronically Signed by           Bibi Starr MD  07/30/23 0903

## 2023-07-31 ENCOUNTER — VBI (OUTPATIENT)
Dept: ADMINISTRATIVE | Facility: OTHER | Age: 43
End: 2023-07-31

## 2023-07-31 LAB
C TRACH DNA SPEC QL NAA+PROBE: NEGATIVE
N GONORRHOEA DNA SPEC QL NAA+PROBE: NEGATIVE

## 2023-07-31 NOTE — TELEPHONE ENCOUNTER
Emma Castillo    ED Visit Information     Ed visit date:7/29/2023   Diagnosis Description: PYELONEPHRITIS  In Network? Yes [address]  Discharge status: Home  Discharged with meds ? Yes  Number of ED visits to date: 1  ED Severity:3     Outreach Information    Outreach successful: Yes 2  Date letter mailed:n/a  Date Finalized:8/1/2023    Care Coordination    Follow up appointment with pcp: no will call to make a follow up appt. Transportation issues ?  No    [employer] type: [facility] PCP: Yes  Transportation: Self Transport  Called PCP first?: No  Feels able to call PCP for urgent problems ?: Yes  Understands what emergencies can be handled by PCP ?: Yes  Ever any problems getting appointment with PCP for minor emergency/urgency problems?: Yes  Practice Contacted Patient ?: No  Pt had ED follow up with pcp/staff ?: No    Seen for follow-up out of network ?: No  08/01/2023 03:15 PM EDT by Dre Serna 08/01/2023 03:15 PM EDT by Dre Serna [address], [place] (Self) [phone] ([place])[phone] (Mobile)  [phone] (Mobile)   - Call Complete

## 2023-08-01 ENCOUNTER — TELEPHONE (OUTPATIENT)
Dept: FAMILY MEDICINE CLINIC | Facility: CLINIC | Age: 43
End: 2023-08-01

## 2023-08-01 DIAGNOSIS — R30.0 DYSURIA: Primary | ICD-10-CM

## 2023-08-01 NOTE — TELEPHONE ENCOUNTER
Patient was seen in the office 7/25/2023 due to an UTI. She states she ended up going to the ER over the weekend and she ended up with a kidney infection. She was given antibiotics and is getting some relief but is now very tired and having issues with sleep. She states she is still having some discomfort as well, she is not sure if this is a side effect of the medication or if it could be from the infection. Patient is taking Keflex.

## 2023-08-02 ENCOUNTER — LAB (OUTPATIENT)
Dept: LAB | Age: 43
End: 2023-08-02
Payer: COMMERCIAL

## 2023-08-02 DIAGNOSIS — R30.0 DYSURIA: ICD-10-CM

## 2023-08-02 LAB
BACTERIA UR QL AUTO: ABNORMAL /HPF
BILIRUB UR QL STRIP: NEGATIVE
CAOX CRY URNS QL MICRO: ABNORMAL /HPF
CLARITY UR: CLEAR
COLOR UR: ABNORMAL
GLUCOSE UR STRIP-MCNC: NEGATIVE MG/DL
HGB UR QL STRIP.AUTO: NEGATIVE
HYALINE CASTS #/AREA URNS LPF: ABNORMAL /LPF
KETONES UR STRIP-MCNC: NEGATIVE MG/DL
LEUKOCYTE ESTERASE UR QL STRIP: ABNORMAL
MUCOUS THREADS UR QL AUTO: ABNORMAL
NITRITE UR QL STRIP: NEGATIVE
NON-SQ EPI CELLS URNS QL MICRO: ABNORMAL /HPF
PH UR STRIP.AUTO: 6.5 [PH]
PROT UR STRIP-MCNC: ABNORMAL MG/DL
RBC #/AREA URNS AUTO: ABNORMAL /HPF
SP GR UR STRIP.AUTO: 1.02 (ref 1–1.03)
UROBILINOGEN UR STRIP-ACNC: <2 MG/DL
WBC #/AREA URNS AUTO: ABNORMAL /HPF

## 2023-08-02 PROCEDURE — 81001 URINALYSIS AUTO W/SCOPE: CPT

## 2023-08-02 PROCEDURE — 87086 URINE CULTURE/COLONY COUNT: CPT

## 2023-08-03 LAB — BACTERIA UR CULT: NORMAL

## 2023-10-11 ENCOUNTER — OCCMED (OUTPATIENT)
Dept: URGENT CARE | Facility: MEDICAL CENTER | Age: 43
End: 2023-10-11
Payer: OTHER MISCELLANEOUS

## 2023-10-11 ENCOUNTER — APPOINTMENT (OUTPATIENT)
Dept: RADIOLOGY | Facility: MEDICAL CENTER | Age: 43
End: 2023-10-11
Payer: OTHER MISCELLANEOUS

## 2023-10-11 DIAGNOSIS — S99.911A RIGHT ANKLE INJURY, INITIAL ENCOUNTER: Primary | ICD-10-CM

## 2023-10-11 DIAGNOSIS — S99.911A RIGHT ANKLE INJURY, INITIAL ENCOUNTER: ICD-10-CM

## 2023-10-11 PROCEDURE — 99213 OFFICE O/P EST LOW 20 MIN: CPT | Performed by: ORTHOPAEDIC SURGERY

## 2023-10-11 PROCEDURE — 73610 X-RAY EXAM OF ANKLE: CPT

## 2023-10-20 ENCOUNTER — APPOINTMENT (OUTPATIENT)
Dept: URGENT CARE | Facility: MEDICAL CENTER | Age: 43
End: 2023-10-20
Payer: OTHER MISCELLANEOUS

## 2023-10-20 PROCEDURE — 99213 OFFICE O/P EST LOW 20 MIN: CPT | Performed by: ORTHOPAEDIC SURGERY

## 2023-10-30 ENCOUNTER — APPOINTMENT (OUTPATIENT)
Dept: URGENT CARE | Facility: MEDICAL CENTER | Age: 43
End: 2023-10-30
Payer: OTHER MISCELLANEOUS

## 2023-10-30 PROCEDURE — 99213 OFFICE O/P EST LOW 20 MIN: CPT | Performed by: PHYSICIAN ASSISTANT

## 2023-11-14 ENCOUNTER — OCCMED (OUTPATIENT)
Dept: URGENT CARE | Facility: MEDICAL CENTER | Age: 43
End: 2023-11-14
Payer: OTHER MISCELLANEOUS

## 2023-11-14 DIAGNOSIS — S93.401D SPRAIN OF LIGAMENT OF RIGHT ANKLE, SUBSEQUENT ENCOUNTER: Primary | ICD-10-CM

## 2023-11-14 PROCEDURE — 99213 OFFICE O/P EST LOW 20 MIN: CPT | Performed by: NURSE PRACTITIONER

## 2023-11-28 ENCOUNTER — OCCMED (OUTPATIENT)
Dept: URGENT CARE | Facility: MEDICAL CENTER | Age: 43
End: 2023-11-28
Payer: OTHER MISCELLANEOUS

## 2023-11-28 DIAGNOSIS — S93.401D SPRAIN OF LIGAMENT OF RIGHT ANKLE, SUBSEQUENT ENCOUNTER: Primary | ICD-10-CM

## 2023-11-28 PROCEDURE — 99213 OFFICE O/P EST LOW 20 MIN: CPT | Performed by: NURSE PRACTITIONER

## 2023-12-19 ENCOUNTER — OCCMED (OUTPATIENT)
Dept: URGENT CARE | Facility: MEDICAL CENTER | Age: 43
End: 2023-12-19
Payer: OTHER MISCELLANEOUS

## 2023-12-19 DIAGNOSIS — S93.401D SPRAIN OF LIGAMENT OF RIGHT ANKLE, SUBSEQUENT ENCOUNTER: Primary | ICD-10-CM

## 2023-12-19 PROCEDURE — 99213 OFFICE O/P EST LOW 20 MIN: CPT | Performed by: NURSE PRACTITIONER

## 2024-01-15 ENCOUNTER — OFFICE VISIT (OUTPATIENT)
Dept: FAMILY MEDICINE CLINIC | Facility: CLINIC | Age: 44
End: 2024-01-15
Payer: COMMERCIAL

## 2024-01-15 VITALS
HEART RATE: 63 BPM | OXYGEN SATURATION: 98 % | HEIGHT: 66 IN | WEIGHT: 196 LBS | DIASTOLIC BLOOD PRESSURE: 73 MMHG | BODY MASS INDEX: 31.5 KG/M2 | TEMPERATURE: 98.8 F | SYSTOLIC BLOOD PRESSURE: 136 MMHG

## 2024-01-15 DIAGNOSIS — Q25.1 COARCTATION OF AORTA: ICD-10-CM

## 2024-01-15 DIAGNOSIS — M53.3 COCCYDYNIA: Primary | ICD-10-CM

## 2024-01-15 DIAGNOSIS — M54.16 LUMBAR RADICULITIS: ICD-10-CM

## 2024-01-15 PROCEDURE — 99214 OFFICE O/P EST MOD 30 MIN: CPT | Performed by: FAMILY MEDICINE

## 2024-01-15 RX ORDER — MELOXICAM 15 MG/1
15 TABLET ORAL DAILY
Qty: 30 TABLET | Refills: 1 | Status: SHIPPED | OUTPATIENT
Start: 2024-01-15

## 2024-01-15 NOTE — PROGRESS NOTES
Assessment/Plan: Patient's records reviewed.  Patient will have x-ray of the sacrum and coccyx as well as the lumbar spine given the lumbar radiculitis.  Patient may use cool compresses.  To consider physical therapy or referral to pain management if symptoms would persist.  Follow-up as needed       Diagnoses and all orders for this visit:    Coccydynia  -     XR sacrum and coccyx; Future  -     meloxicam (MOBIC) 15 mg tablet; Take 1 tablet (15 mg total) by mouth daily    Lumbar radiculitis  -     XR spine lumbar 2 or 3 views injury; Future  -     meloxicam (MOBIC) 15 mg tablet; Take 1 tablet (15 mg total) by mouth daily    Coarctation of aorta            Subjective:        Patient ID: Janiya Gonzales is a 43 y.o. female.      Patient is here with tailbone pain over the past week.  Patient did fall in October landing on buttocks.  Patient did have occasional back pain associated with this.  This is now worsening.  Patient has pain with squatting down and getting up.  Patient with some radicular symptoms right leg.  No problems with urination or defecation.  Patient has used ice heat ibuprofen.    Back Pain  This is a chronic problem. The current episode started more than 1 year ago. The problem occurs 2 to 4 times per day. The problem has been gradually worsening since onset. The pain is present in the gluteal. The quality of the pain is described as aching and burning. The pain radiates to the right foot and right thigh. The pain is at a severity of 7/10. The pain is The same all the time. The symptoms are aggravated by bending, coughing, sitting and twisting. Pertinent negatives include no abdominal pain, bladder incontinence, bowel incontinence, chest pain, dysuria, fever, headaches, leg pain, numbness, paresis, paresthesias, pelvic pain, perianal numbness, tingling, weakness or weight loss. Risk factors include obesity.         The following portions of the patient's history were reviewed and updated as  "appropriate: allergies, current medications, past family history, past medical history, past social history, past surgical history and problem list.      Review of Systems   Constitutional: Negative.  Negative for fever and weight loss.   HENT: Negative.     Eyes: Negative.    Respiratory: Negative.     Cardiovascular: Negative.  Negative for chest pain.   Gastrointestinal: Negative.  Negative for abdominal pain and bowel incontinence.   Endocrine: Negative.    Genitourinary: Negative.  Negative for bladder incontinence, dysuria and pelvic pain.   Musculoskeletal:  Positive for arthralgias and back pain. Negative for gait problem.   Skin: Negative.    Allergic/Immunologic: Negative.    Neurological:  Negative for tingling, weakness, numbness, headaches and paresthesias.   Hematological: Negative.    Psychiatric/Behavioral: Negative.             Objective:               /73 (BP Location: Right arm, Patient Position: Sitting, Cuff Size: Standard)   Pulse 63   Temp 98.8 °F (37.1 °C) (Temporal)   Ht 5' 6\" (1.676 m)   Wt 88.9 kg (196 lb)   SpO2 98%   BMI 31.64 kg/m²          Physical Exam  Vitals and nursing note reviewed.   Constitutional:       General: She is not in acute distress.     Appearance: Normal appearance. She is not ill-appearing, toxic-appearing or diaphoretic.   Musculoskeletal:         General: Tenderness present.      Comments: Right SI joint pain.  No significant pain lumbar region.  Patient with gross sensation to light touch intact bilateral lower extremities.  Negative straight leg raise bilaterally.   Neurological:      Mental Status: She is alert.         "

## 2024-01-18 ENCOUNTER — APPOINTMENT (OUTPATIENT)
Dept: RADIOLOGY | Facility: MEDICAL CENTER | Age: 44
End: 2024-01-18
Payer: COMMERCIAL

## 2024-01-18 DIAGNOSIS — M53.3 COCCYDYNIA: ICD-10-CM

## 2024-01-18 DIAGNOSIS — M54.16 LUMBAR RADICULITIS: ICD-10-CM

## 2024-01-18 PROCEDURE — 72220 X-RAY EXAM SACRUM TAILBONE: CPT

## 2024-01-18 PROCEDURE — 72100 X-RAY EXAM L-S SPINE 2/3 VWS: CPT

## 2024-02-21 PROBLEM — J01.10 ACUTE NON-RECURRENT FRONTAL SINUSITIS: Status: RESOLVED | Noted: 2019-08-12 | Resolved: 2024-02-21

## 2024-02-29 DIAGNOSIS — M53.3 COCCYDYNIA: ICD-10-CM

## 2024-02-29 DIAGNOSIS — M54.16 LUMBAR RADICULITIS: ICD-10-CM

## 2024-02-29 RX ORDER — MELOXICAM 15 MG/1
15 TABLET ORAL DAILY
Qty: 30 TABLET | Refills: 5 | Status: SHIPPED | OUTPATIENT
Start: 2024-02-29

## 2024-03-25 ENCOUNTER — RA CDI HCC (OUTPATIENT)
Dept: OTHER | Facility: HOSPITAL | Age: 44
End: 2024-03-25

## 2024-03-25 PROBLEM — S06.0X0A CONCUSSION WITHOUT LOSS OF CONSCIOUSNESS: Status: RESOLVED | Noted: 2021-09-17 | Resolved: 2024-03-25

## 2024-03-25 PROBLEM — M25.561 ACUTE PAIN OF RIGHT KNEE: Status: RESOLVED | Noted: 2019-07-11 | Resolved: 2024-03-25

## 2024-04-01 ENCOUNTER — CONSULT (OUTPATIENT)
Dept: FAMILY MEDICINE CLINIC | Facility: CLINIC | Age: 44
End: 2024-04-01
Payer: COMMERCIAL

## 2024-04-01 VITALS
SYSTOLIC BLOOD PRESSURE: 128 MMHG | OXYGEN SATURATION: 97 % | WEIGHT: 199.6 LBS | HEART RATE: 81 BPM | HEIGHT: 66 IN | DIASTOLIC BLOOD PRESSURE: 58 MMHG | BODY MASS INDEX: 32.08 KG/M2 | TEMPERATURE: 98.1 F

## 2024-04-01 DIAGNOSIS — Q25.1 COARCTATION OF AORTA: ICD-10-CM

## 2024-04-01 DIAGNOSIS — Z01.818 PREOP EXAMINATION: Primary | ICD-10-CM

## 2024-04-01 DIAGNOSIS — H25.013 CORTICAL AGE-RELATED CATARACT OF BOTH EYES: ICD-10-CM

## 2024-04-01 PROCEDURE — 99214 OFFICE O/P EST MOD 30 MIN: CPT | Performed by: FAMILY MEDICINE

## 2024-04-01 RX ORDER — DIFLUPREDNATE OPHTHALMIC 0.5 MG/ML
EMULSION OPHTHALMIC
COMMUNITY
Start: 2024-03-28

## 2024-04-01 RX ORDER — MOXIFLOXACIN 5 MG/ML
SOLUTION/ DROPS OPHTHALMIC
COMMUNITY
Start: 2024-03-28

## 2024-04-01 NOTE — PROGRESS NOTES
Assessment/Plan: Patient low risk for cardiopulmonary event.  Okay to proceed with surgery.  Form completed at this time.  Patient has reached out to cardiology already and was cleared.       Diagnoses and all orders for this visit:    Preop examination    Cortical age-related cataract of both eyes    Coarctation of aorta    Other orders  -     Difluprednate 0.05 % EMUL  -     moxifloxacin (VIGAMOX) 0.5 % ophthalmic solution            Subjective:        Patient ID: Janiya Gonzales is a 43 y.o. female.      Patient is here for preop clearance requested by Dr. Aguillon for bilateral cataract surgery to be done in the near future.  Patient will be having the left eye done on April 11 and the right on May 9.  Patient with visual changes related to cataracts.  Otherwise patient in normal state of health.  No new chest pain shortness of breath palpitations syncope or dizziness.  No URI symptoms or cough or fevers or chills.  Urination defecation normal.  No edema of lower extremities.  Patient did talk to cardiology about getting surgery done.  Patient has been cleared by cardiology.          The following portions of the patient's history were reviewed and updated as appropriate: allergies, current medications, past family history, past medical history, past social history, past surgical history and problem list.      Review of Systems   Constitutional: Negative.    HENT: Negative.     Eyes:  Positive for visual disturbance.   Respiratory: Negative.     Cardiovascular: Negative.    Gastrointestinal: Negative.    Endocrine: Negative.    Genitourinary: Negative.    Musculoskeletal: Negative.    Skin: Negative.    Allergic/Immunologic: Negative.    Neurological: Negative.    Hematological: Negative.    Psychiatric/Behavioral: Negative.             Objective:        Depression Screening and Follow-up Plan: Patient was screened for depression during today's encounter. They screened negative with a PHQ-2 score of  "0.            /58 (BP Location: Right arm, Patient Position: Sitting, Cuff Size: Standard)   Pulse 81   Temp 98.1 °F (36.7 °C) (Temporal)   Ht 5' 6\" (1.676 m)   Wt 90.5 kg (199 lb 9.6 oz)   SpO2 97%   BMI 32.22 kg/m²          Physical Exam  Vitals and nursing note reviewed.   Constitutional:       General: She is not in acute distress.     Appearance: Normal appearance. She is not ill-appearing, toxic-appearing or diaphoretic.   HENT:      Head: Normocephalic and atraumatic.      Right Ear: Tympanic membrane, ear canal and external ear normal. There is no impacted cerumen.      Left Ear: Tympanic membrane, ear canal and external ear normal. There is no impacted cerumen.      Nose: Nose normal. No congestion or rhinorrhea.      Mouth/Throat:      Mouth: Mucous membranes are moist.      Pharynx: No oropharyngeal exudate or posterior oropharyngeal erythema.   Eyes:      General: No scleral icterus.        Right eye: No discharge.         Left eye: No discharge.   Neck:      Vascular: No carotid bruit.   Cardiovascular:      Rate and Rhythm: Normal rate and regular rhythm.      Pulses: Normal pulses.      Heart sounds: Murmur heard.      No friction rub. No gallop.   Pulmonary:      Effort: Pulmonary effort is normal. No respiratory distress.      Breath sounds: Normal breath sounds. No stridor. No wheezing, rhonchi or rales.   Chest:      Chest wall: No tenderness.   Musculoskeletal:         General: No swelling, tenderness, deformity or signs of injury. Normal range of motion.      Cervical back: Normal range of motion and neck supple. No rigidity. No muscular tenderness.      Right lower leg: No edema.      Left lower leg: No edema.   Lymphadenopathy:      Cervical: No cervical adenopathy.   Skin:     General: Skin is warm and dry.      Capillary Refill: Capillary refill takes less than 2 seconds.      Coloration: Skin is not jaundiced.      Findings: No bruising, erythema, lesion or rash.   Neurological: "      Mental Status: She is alert and oriented to person, place, and time. Mental status is at baseline.      Cranial Nerves: No cranial nerve deficit.      Sensory: No sensory deficit.      Motor: No weakness.      Coordination: Coordination normal.      Gait: Gait normal.   Psychiatric:         Mood and Affect: Mood normal.         Behavior: Behavior normal.         Thought Content: Thought content normal.         Judgment: Judgment normal.

## 2024-04-29 ENCOUNTER — CONSULT (OUTPATIENT)
Dept: FAMILY MEDICINE CLINIC | Facility: CLINIC | Age: 44
End: 2024-04-29
Payer: COMMERCIAL

## 2024-04-29 VITALS
WEIGHT: 198 LBS | DIASTOLIC BLOOD PRESSURE: 78 MMHG | HEART RATE: 90 BPM | OXYGEN SATURATION: 98 % | SYSTOLIC BLOOD PRESSURE: 128 MMHG | TEMPERATURE: 98.9 F | BODY MASS INDEX: 31.82 KG/M2 | HEIGHT: 66 IN

## 2024-04-29 DIAGNOSIS — Z01.818 PREOP EXAMINATION: Primary | ICD-10-CM

## 2024-04-29 DIAGNOSIS — Q25.1 COARCTATION OF AORTA: ICD-10-CM

## 2024-04-29 DIAGNOSIS — H25.011 CORTICAL AGE-RELATED CATARACT OF RIGHT EYE: ICD-10-CM

## 2024-04-29 PROCEDURE — 99214 OFFICE O/P EST MOD 30 MIN: CPT | Performed by: FAMILY MEDICINE

## 2024-04-29 NOTE — PROGRESS NOTES
"Assessment/Plan: Patient low risk for cardiopulmonary event.  Okay to proceed with surgery.  Form completed and faxed at this time.  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 female.      Patient is here for preop clearance requested by Dr. Aguillon for right eye cataract surgery to be done on May 9.  Patient with decreased visual acuity on the right.  No recent chest pain shortness of breath palpitations syncope or dizziness.  No URI symptoms or fevers or chills or cough.  No lower extremity edema.  No problems with urination or defecation.          The following portions of the patient's history were reviewed and updated as appropriate: allergies, current medications, past family history, past medical history, past social history, past surgical history and problem list.      Review of Systems   Constitutional: Negative.    HENT: Negative.     Eyes:  Positive for visual disturbance.   Respiratory: Negative.     Cardiovascular: Negative.    Gastrointestinal: Negative.    Endocrine: Negative.    Genitourinary: Negative.    Musculoskeletal: Negative.    Skin: Negative.    Allergic/Immunologic: Negative.    Neurological: Negative.    Hematological: Negative.    Psychiatric/Behavioral: Negative.             Objective:               /78 (BP Location: Right arm, Patient Position: Sitting, Cuff Size: Standard)   Pulse 90   Temp 98.9 °F (37.2 °C) (Temporal)   Ht 5' 6\" (1.676 m)   Wt 89.8 kg (198 lb)   SpO2 98%   BMI 31.96 kg/m²          Physical Exam  Vitals and nursing note reviewed.   Constitutional:       General: She is not in acute distress.     Appearance: Normal appearance. She is not ill-appearing, toxic-appearing or diaphoretic.   HENT:      Head: Normocephalic and atraumatic.      Right Ear: Tympanic membrane, ear " canal and external ear normal. There is no impacted cerumen.      Left Ear: Tympanic membrane, ear canal and external ear normal. There is no impacted cerumen.      Nose: Nose normal. No congestion or rhinorrhea.      Mouth/Throat:      Mouth: Mucous membranes are moist.      Pharynx: No oropharyngeal exudate or posterior oropharyngeal erythema.   Eyes:      General: No scleral icterus.        Right eye: No discharge.         Left eye: No discharge.   Neck:      Vascular: No carotid bruit.   Cardiovascular:      Rate and Rhythm: Normal rate and regular rhythm.      Pulses: Normal pulses.      Heart sounds: Normal heart sounds. No murmur heard.     No friction rub. No gallop.   Pulmonary:      Effort: Pulmonary effort is normal. No respiratory distress.      Breath sounds: Normal breath sounds. No stridor. No wheezing, rhonchi or rales.   Chest:      Chest wall: No tenderness.   Musculoskeletal:         General: No swelling, tenderness, deformity or signs of injury. Normal range of motion.      Cervical back: Normal range of motion and neck supple. No rigidity. No muscular tenderness.      Right lower leg: No edema.      Left lower leg: No edema.   Lymphadenopathy:      Cervical: No cervical adenopathy.   Skin:     General: Skin is warm and dry.      Capillary Refill: Capillary refill takes less than 2 seconds.      Coloration: Skin is not jaundiced.      Findings: No bruising, erythema, lesion or rash.   Neurological:      General: No focal deficit present.      Mental Status: She is alert and oriented to person, place, and time.      Cranial Nerves: No cranial nerve deficit.      Sensory: No sensory deficit.      Motor: No weakness.      Coordination: Coordination normal.      Gait: Gait normal.   Psychiatric:         Mood and Affect: Mood normal.         Behavior: Behavior normal.         Thought Content: Thought content normal.         Judgment: Judgment normal.

## 2025-04-17 ENCOUNTER — OFFICE VISIT (OUTPATIENT)
Age: 45
End: 2025-04-17
Payer: COMMERCIAL

## 2025-04-17 VITALS
WEIGHT: 193.6 LBS | HEIGHT: 66 IN | HEART RATE: 102 BPM | TEMPERATURE: 98.2 F | BODY MASS INDEX: 31.12 KG/M2 | OXYGEN SATURATION: 97 % | DIASTOLIC BLOOD PRESSURE: 64 MMHG | SYSTOLIC BLOOD PRESSURE: 124 MMHG

## 2025-04-17 DIAGNOSIS — J01.00 ACUTE NON-RECURRENT MAXILLARY SINUSITIS: Primary | ICD-10-CM

## 2025-04-17 DIAGNOSIS — Q25.1 COARCTATION OF AORTA: ICD-10-CM

## 2025-04-17 PROCEDURE — 99213 OFFICE O/P EST LOW 20 MIN: CPT | Performed by: FAMILY MEDICINE

## 2025-04-17 RX ORDER — CEFDINIR 300 MG/1
300 CAPSULE ORAL EVERY 12 HOURS SCHEDULED
Qty: 14 CAPSULE | Refills: 0 | Status: SHIPPED | OUTPATIENT
Start: 2025-04-17 | End: 2025-04-24

## 2025-04-17 NOTE — ASSESSMENT & PLAN NOTE
Patient will rest and increase fluids and have supportive care and start Omnicef as directed.    Orders:    cefdinir (OMNICEF) 300 mg capsule; Take 1 capsule (300 mg total) by mouth every 12 (twelve) hours for 7 days

## 2025-04-17 NOTE — PROGRESS NOTES
"Name: Janiya Gonzales      : 1980      MRN: [id number]  Encounter Provider: Sylvain Lopez DO  Encounter Date: 2025   Encounter department: [facility]  :  Assessment & Plan  Acute non-recurrent maxillary sinusitis  Patient will rest and increase fluids and have supportive care and start Omnicef as directed.    Orders:    cefdinir (OMNICEF) 300 mg capsule; Take 1 capsule (300 mg total) by mouth every 12 (twelve) hours for 7 days    Coarctation of aorta               Depression Screening and Follow-up Plan: Patient was screened for depression during today's encounter. They screened negative with a PHQ-2 score of 0.        History of Present Illness   Patient is here with sinus pressure drainage ear fullness cough with postnasal drip which started last week.  No vomiting or diarrhea associated with this.  No fever noted.  No extreme body aches.  Mild fatigue.  Patient to use over-the-counter cold and flu medication.    Sinus Problem  Associated symptoms include congestion, coughing, ear pain, headaches and sinus pressure. Pertinent negatives include no chills.     Review of Systems   Constitutional: Negative.  Negative for activity change, appetite change, chills and fever.   HENT:  Positive for congestion, ear pain, postnasal drip, rhinorrhea, sinus pressure and sinus pain.    Eyes: Negative.    Respiratory:  Positive for cough.    Cardiovascular: Negative.    Gastrointestinal: Negative.    Endocrine: Negative.    Genitourinary: Negative.    Musculoskeletal: Negative.    Skin: Negative.    Allergic/Immunologic: Negative.    Neurological:  Positive for headaches.   Hematological: Negative.    Psychiatric/Behavioral: Negative.         Objective   /64 (BP Location: Right arm, Patient Position: Sitting, Cuff Size: Standard)   Pulse 102   Temp 98.2 °F (36.8 °C) (Temporal)   Ht 5' 6\" (1.676 m)   Wt 87.8 kg (193 lb 9.6 oz)   SpO2 97%   BMI 31.25 kg/m²      Physical " Exam  Vitals and nursing note reviewed.   Constitutional:       General: She is not in acute distress.     Appearance: She is well-developed. She is ill-appearing. She is not toxic-appearing or diaphoretic.   HENT:      Head: Normocephalic and atraumatic.      Right Ear: Tympanic membrane, ear canal and external ear normal.      Left Ear: Tympanic membrane, ear canal and external ear normal.      Nose: Rhinorrhea present.      Mouth/Throat:      Mouth: Mucous membranes are moist.      Pharynx: Oropharyngeal exudate present. No posterior oropharyngeal erythema.   Eyes:      General:         Right eye: No discharge.         Left eye: No discharge.   Neck:      Thyroid: No thyromegaly.      Vascular: No carotid bruit.      Trachea: No tracheal deviation.   Cardiovascular:      Rate and Rhythm: Normal rate and regular rhythm.      Pulses: Normal pulses.      Heart sounds: Normal heart sounds. No murmur heard.     No gallop.   Pulmonary:      Effort: Pulmonary effort is normal. No respiratory distress.      Breath sounds: Normal breath sounds. No stridor. No wheezing or rales.   Chest:      Chest wall: No tenderness.   Musculoskeletal:         General: No tenderness or deformity. Normal range of motion.      Cervical back: Normal range of motion and neck supple.      Right lower leg: No edema.      Left lower leg: No edema.   Lymphadenopathy:      Cervical: No cervical adenopathy.   Skin:     General: Skin is warm and dry.      Capillary Refill: Capillary refill takes less than 2 seconds.      Coloration: Skin is not pale.      Findings: No erythema or rash.   Neurological:      Mental Status: She is alert and oriented to person, place, and time. Mental status is at baseline.      Cranial Nerves: No cranial nerve deficit.      Motor: No abnormal muscle tone.      Coordination: Coordination normal.      Deep Tendon Reflexes: Reflexes normal.   Psychiatric:         Mood and Affect: Mood normal.         Behavior: Behavior  normal.         Thought Content: Thought content normal.         Judgment: Judgment normal.

## 2025-05-17 PROBLEM — J01.00 ACUTE NON-RECURRENT MAXILLARY SINUSITIS: Status: RESOLVED | Noted: 2025-04-17 | Resolved: 2025-05-17

## 2025-07-03 ENCOUNTER — VBI (OUTPATIENT)
Dept: ADMINISTRATIVE | Facility: OTHER | Age: 45
End: 2025-07-03

## 2025-07-03 NOTE — TELEPHONE ENCOUNTER
07/03/25 11:50 AM     Chart reviewed for CRC: Colonoscopy ; nothing is submitted to the patient's insurance at this time.     Cem Moise MA   [facility]